# Patient Record
Sex: FEMALE | Race: WHITE | NOT HISPANIC OR LATINO | Employment: OTHER | URBAN - NONMETROPOLITAN AREA
[De-identification: names, ages, dates, MRNs, and addresses within clinical notes are randomized per-mention and may not be internally consistent; named-entity substitution may affect disease eponyms.]

---

## 2021-01-13 ENCOUNTER — OFFICE VISIT (OUTPATIENT)
Dept: CARDIOLOGY | Facility: CLINIC | Age: 86
End: 2021-01-13

## 2021-01-13 VITALS
OXYGEN SATURATION: 95 % | HEIGHT: 62 IN | BODY MASS INDEX: 33.49 KG/M2 | WEIGHT: 182 LBS | TEMPERATURE: 97.5 F | SYSTOLIC BLOOD PRESSURE: 132 MMHG | DIASTOLIC BLOOD PRESSURE: 70 MMHG | HEART RATE: 53 BPM

## 2021-01-13 DIAGNOSIS — E78.5 HYPERLIPIDEMIA LDL GOAL <100: ICD-10-CM

## 2021-01-13 DIAGNOSIS — I50.33 ACUTE ON CHRONIC DIASTOLIC CHF (CONGESTIVE HEART FAILURE) (HCC): Primary | ICD-10-CM

## 2021-01-13 DIAGNOSIS — I48.20 ATRIAL FIBRILLATION, CHRONIC (HCC): ICD-10-CM

## 2021-01-13 DIAGNOSIS — I10 ESSENTIAL HYPERTENSION: ICD-10-CM

## 2021-01-13 PROCEDURE — 99214 OFFICE O/P EST MOD 30 MIN: CPT | Performed by: INTERNAL MEDICINE

## 2021-01-13 PROCEDURE — 93000 ELECTROCARDIOGRAM COMPLETE: CPT | Performed by: INTERNAL MEDICINE

## 2021-01-13 RX ORDER — TORSEMIDE 100 MG/1
100 TABLET ORAL DAILY
Qty: 90 TABLET | Refills: 3 | Status: SHIPPED | OUTPATIENT
Start: 2021-01-13 | End: 2021-04-13

## 2021-01-13 RX ORDER — INSULIN GLARGINE 100 [IU]/ML
100 INJECTION, SOLUTION SUBCUTANEOUS
COMMUNITY
Start: 2020-12-30 | End: 2022-05-24 | Stop reason: SDUPTHER

## 2021-01-13 RX ORDER — PEN NEEDLE, DIABETIC 29 G X1/2"
NEEDLE, DISPOSABLE MISCELLANEOUS
COMMUNITY
Start: 2020-12-12 | End: 2022-05-24 | Stop reason: SDUPTHER

## 2021-01-13 RX ORDER — ATORVASTATIN CALCIUM 40 MG/1
40 TABLET, FILM COATED ORAL DAILY
Qty: 90 TABLET | Refills: 3 | Status: SHIPPED | OUTPATIENT
Start: 2021-01-13

## 2021-01-13 RX ORDER — DILTIAZEM HYDROCHLORIDE 360 MG/1
360 CAPSULE, EXTENDED RELEASE ORAL DAILY
Qty: 90 CAPSULE | Refills: 3 | Status: SHIPPED | OUTPATIENT
Start: 2021-01-13 | End: 2021-04-13

## 2021-01-13 RX ORDER — GUAIFENESIN 600 MG/1
600 TABLET, EXTENDED RELEASE ORAL 2 TIMES DAILY
COMMUNITY

## 2021-01-13 RX ORDER — CALCIUM CITRATE/VITAMIN D3 200MG-6.25
TABLET ORAL
COMMUNITY
Start: 2020-10-22 | End: 2022-05-24 | Stop reason: SDUPTHER

## 2021-01-13 RX ORDER — MONTELUKAST SODIUM 10 MG/1
10 TABLET ORAL NIGHTLY
COMMUNITY
End: 2022-05-24 | Stop reason: ALTCHOICE

## 2021-01-13 NOTE — PROGRESS NOTES
[facility] [place]  [facility] CARDIOLOGY  [address] [place] [address]  Dept: [phone]  Dept Fax: [phone]    Rena Hernandez  11/23/1931    Follow Up Office Visit Note    History of Present Illness:  Rena Hernandez is a 89 y.o. female who presents to the clinic for Follow-up and Congestive Heart Failure. She seems steady 1 plus edema, no major SOB, no chest pain, on torsemide 100.50 alternating,     The following portions of the patient's history were reviewed and updated as appropriate: allergies, current medications, past family history, past medical history, past social history, past surgical history and problem list.    Medications:  apixaban tablet  atorvastatin  BD Insulin Syringe U/F misc  calcium carbonate  dilTIAZem CD  famotidine  guaiFENesin  iron polysacch complex-B12-FA capsule  Lantus solution  montelukast  senna  torsemide  True Metrix Blood Glucose Test strip    Subjective  Allergies   Allergen Reactions   • Ibuprofen Other (See Comments)     Kidney bleed        Past Medical History:   Diagnosis Date   • A-fib (CMS/HCC)    • Body mass index 30.0-30.9, adult    • Carotid artery disease (CMS/HCC)    • Chest pain    • CHF (congestive heart failure) (CMS/HCC)    • Chronic atrial fibrillation, unspecified (CMS/HCC)    • Diabetes mellitus due to underlying condition (CMS/HCC)    • Diastolic heart failure (CMS/HCC)     She is doing well, no edema, no major SOb on 100.50 Torsemide alternating.   • Dyspnea    • Edema    • Hyperlipidemia    • Hypertension    • Mesenteric ischemia (CMS/HCC)    • Renal disease    • Syncope    • Ventricular tachycardia (CMS/HCC)        Past Surgical History:   Procedure Laterality Date   • ABDOMINAL AORTIC ANEURYSM REPAIR         Family History   Family history unknown: Yes        Social History     Socioeconomic History   • Marital status:      Spouse name: Not on file   • Number of children: Not on file   • Years of education: Not on  "file   • Highest education level: Not on file   Tobacco Use   • Smoking status: Never Smoker   • Smokeless tobacco: Never Used   • Tobacco comment: UNKNOWN   Substance and Sexual Activity   • Alcohol use: Never     Frequency: Never   • Drug use: Never   • Sexual activity: Defer       Review of Systems   Constitutional: Negative.    HENT: Negative.    Respiratory: Negative.    Cardiovascular: Positive for leg swelling.   Endocrine: Negative.    Genitourinary: Negative.    Musculoskeletal: Negative.    Skin: Negative.    Allergic/Immunologic: Negative.    Neurological: Negative.    Hematological: Negative.    Psychiatric/Behavioral: Negative.        Cardiovascular Procedures    ECHO/MUGA: ECHOCARDIOGRAM - SCAN - ECHO-09- (01/11/2021)    STRESS TESTS:   CARDIAC CATH:  DEVICES:   HOLTER:   CT/MRI:   VASCULAR:   CARDIOTHORACIC:    Objective  Vitals:    01/13/21 1304   BP: 132/70   BP Location: Left arm   Patient Position: Sitting   Cuff Size: Large Adult   Pulse: 53   Temp: 97.5 °F (36.4 °C)   TempSrc: Infrared   SpO2: 95%   Weight: 82.6 kg (182 lb)   Height: 157.5 cm (62\")   PainSc: 0-No pain     Body mass index is 33.29 kg/m².     Physical Exam  Edema:     Peripheral edema present.     Pretibial: 2+ edema of the left pretibial area.     Ankle: 2+ edema of the left ankle.     Feet: 2+ edema of the left foot.         Diagnostic Data    ECG 12 Lead    Date/Time: 1/13/2021 1:26 PM  Performed by: Sterling Ramon MD  Authorized by: Sterling Ramon MD   Comparison: compared with previous ECG from 7/16/2019  Similar to previous ECG  Rhythm: atrial fibrillation  Rate: normal  QRS axis: left              Assessment and Plan  Diagnoses and all orders for this visit:    Acute on chronic diastolic CHF (congestive heart failure) (CMS/HCC)-   She seems doing well, steady, 1 plus edema, no chest pain, , no SOB,     Atrial fibrillation, chronic (CMS/HCC)- rate control 70 on Cardizem 360 mg and Eliquis 2,5 bid " "    Essential hypertension- BP is 125.80 , will keep just diuretics and Cardizem,    Hyperlipidemia LDL goal <100- on Lipitor  40 mg    Other orders  -     guaiFENesin (MUCINEX) 600 MG 12 hr tablet; Take 600 mg by mouth 2 (Two) Times a Day.  -     montelukast (SINGULAIR) 10 MG tablet; Take 10 mg by mouth Every Night.  -     BD Insulin Syringe U/F 31G X 5/16\" 0.5 ML misc  -     True Metrix Blood Glucose Test test strip  -     Lantus 100 UNIT/ML injection; 100 Units. Take as directed.         No follow-ups on file.    Sterling Ramon MD  01/13/2021  "

## 2021-04-13 ENCOUNTER — OFFICE VISIT (OUTPATIENT)
Dept: CARDIOLOGY | Facility: CLINIC | Age: 86
End: 2021-04-13

## 2021-04-13 VITALS
SYSTOLIC BLOOD PRESSURE: 130 MMHG | HEIGHT: 62 IN | WEIGHT: 173 LBS | RESPIRATION RATE: 12 BRPM | OXYGEN SATURATION: 99 % | DIASTOLIC BLOOD PRESSURE: 67 MMHG | HEART RATE: 58 BPM | BODY MASS INDEX: 31.83 KG/M2 | TEMPERATURE: 97.7 F

## 2021-04-13 DIAGNOSIS — E78.5 HYPERLIPIDEMIA LDL GOAL <100: ICD-10-CM

## 2021-04-13 DIAGNOSIS — I50.33 ACUTE ON CHRONIC DIASTOLIC CHF (CONGESTIVE HEART FAILURE) (HCC): Primary | ICD-10-CM

## 2021-04-13 DIAGNOSIS — I10 ESSENTIAL HYPERTENSION: ICD-10-CM

## 2021-04-13 DIAGNOSIS — I48.20 ATRIAL FIBRILLATION, CHRONIC (HCC): ICD-10-CM

## 2021-04-13 PROCEDURE — 93000 ELECTROCARDIOGRAM COMPLETE: CPT | Performed by: INTERNAL MEDICINE

## 2021-04-13 PROCEDURE — 99214 OFFICE O/P EST MOD 30 MIN: CPT | Performed by: INTERNAL MEDICINE

## 2021-04-13 RX ORDER — CARVEDILOL 6.25 MG/1
12.5 TABLET ORAL 2 TIMES DAILY
COMMUNITY
Start: 2021-04-02 | End: 2021-04-13 | Stop reason: SDUPTHER

## 2021-04-13 RX ORDER — TORSEMIDE 100 MG/1
100 TABLET ORAL DAILY
COMMUNITY
End: 2021-10-26 | Stop reason: SDUPTHER

## 2021-04-13 RX ORDER — CARVEDILOL 12.5 MG/1
12.5 TABLET ORAL 2 TIMES DAILY
Qty: 180 TABLET | Refills: 3 | Status: SHIPPED | OUTPATIENT
Start: 2021-04-13 | End: 2022-05-10 | Stop reason: SDUPTHER

## 2021-04-13 NOTE — PROGRESS NOTES
[facility] [place]  [facility] CARDIOLOGY  [address] [place] [address]  Dept: [phone]  Dept Fax: [phone]    Rena Hernandez  11/23/1931    Follow Up Office Visit Note    History of Present Illness:  Rena Hernandez is a 89 y.o. female who presents to the clinic for follow up for CAF-She has been at the hospital with chest pain, ruled out for Mi, he meds rate control has changed to Coreg 6,25 bid instead Cardizem, her HR is 98, resting, will increase Coreg to 12,5 bid,keep Eliquis 2,5 bid     The following portions of the patient's history were reviewed and updated as appropriate: allergies, current medications, past family history, past medical history, past social history, past surgical history and problem list.    Medications:  apixaban tablet  atorvastatin  BD Insulin Syringe U/F misc  calcium carbonate  carvedilol  famotidine  guaiFENesin  iron polysacch complex-B12-FA capsule  Lantus solution  montelukast  senna  torsemide  True Metrix Blood Glucose Test strip    Subjective  Allergies   Allergen Reactions   • Ibuprofen Other (See Comments)     Kidney bleed        Past Medical History:   Diagnosis Date   • A-fib (CMS/HCC)    • Body mass index 30.0-30.9, adult    • Carotid artery disease (CMS/HCC)    • Chest pain    • CHF (congestive heart failure) (CMS/HCC)    • Chronic atrial fibrillation, unspecified (CMS/HCC)    • Diabetes mellitus due to underlying condition (CMS/HCC)    • Diastolic heart failure (CMS/HCC)     She is doing well, no edema, no major SOb on 100.50 Torsemide alternating.   • Dyspnea    • Edema    • Hyperlipidemia    • Hypertension    • Mesenteric ischemia (CMS/HCC)    • Renal disease    • Syncope    • Ventricular tachycardia (CMS/HCC)        Past Surgical History:   Procedure Laterality Date   • ABDOMINAL AORTIC ANEURYSM REPAIR         Family History   Family history unknown: Yes        Social History     Socioeconomic History   • Marital status:       "Spouse name: Not on file   • Number of children: Not on file   • Years of education: Not on file   • Highest education level: Not on file   Tobacco Use   • Smoking status: Never Smoker   • Smokeless tobacco: Never Used   • Tobacco comment: UNKNOWN   Substance and Sexual Activity   • Alcohol use: Never   • Drug use: Never   • Sexual activity: Defer       Review of Systems   All other systems reviewed and are negative.      Cardiovascular Procedures    ECHO/MUGA:   STRESS TESTS:   CARDIAC CATH:   DEVICES:   HOLTER:   CT/MRI:   VASCULAR:   CARDIOTHORACIC:     Objective  Vitals:    04/13/21 1029   BP: 130/67   BP Location: Left arm   Patient Position: Sitting   Cuff Size: Adult   Pulse: 58   Resp: 12   Temp: 97.7 °F (36.5 °C)   TempSrc: Infrared   SpO2: 99%   Weight: 78.5 kg (173 lb)   Height: 157.5 cm (62\")   PainSc: 0-No pain     Body mass index is 31.64 kg/m².     Physical Exam  Constitutional:       Appearance: Healthy appearance. Not in distress.   Neck:      Vascular: No JVR. JVD normal.   Pulmonary:      Effort: Pulmonary effort is normal.      Breath sounds: Normal breath sounds. No wheezing. No rhonchi. No rales.   Chest:      Chest wall: Not tender to palpatation.   Cardiovascular:      PMI at left midclavicular line. Normal rate. Regular rhythm. Normal S1. Normal S2.      Murmurs: There is no murmur.      No gallop. No click. No rub.   Pulses:     Intact distal pulses.   Edema:     Peripheral edema absent.   Abdominal:      General: Bowel sounds are normal.      Palpations: Abdomen is soft.      Tenderness: There is no abdominal tenderness.   Musculoskeletal: Normal range of motion.         General: No tenderness. Skin:     General: Skin is warm and dry.   Neurological:      General: No focal deficit present.      Mental Status: Alert and oriented to person, place and time.          Diagnostic Data    ECG 12 Lead    Date/Time: 4/13/2021 11:14 AM  Performed by: Sterling Ramon MD  Authorized by: " Sterling Ramon MD   Comparison: compared with previous ECG   Similar to previous ECG  Rhythm: atrial fibrillation  BPM: 98  QRS axis: left    Clinical impression: abnormal EKG            Assessment and Plan  Diagnoses and all orders for this visit:    Acute on chronic diastolic CHF (congestive heart failure) (CMS/HCC)- She has mild edema, has gained some weight has CRF, will increase back the Torsemide to 100.50 alternating, will get lab  -     CBC & Differential  -     Comprehensive Metabolic Panel    Atrial fibrillation, chronic (CMS/HCC)- rate control is 98, will double Coreg to 12.5 bid, keep Eliquis 2,5 bid     Essential hypertension- The BP is 110.60. only on Coreg     Hyperlipidemia LDL goal <100- On Lipitor     Other orders  -     Discontinue: carvedilol (COREG) 6.25 MG tablet; Take 12.5 mg by mouth 2 (Two) Times a Day.  -     torsemide (DEMADEX) 100 MG tablet; Take 100 mg by mouth Daily. Pt is taking 1/2 pill per day  -     carvedilol (COREG) 12.5 MG tablet; Take 1 tablet by mouth 2 (Two) Times a Day.         No follow-ups on file.    Sterling Ramon MD  04/13/2021

## 2021-04-14 LAB
ALBUMIN SERPL-MCNC: 3.4 G/DL (ref 3.6–4.6)
ALBUMIN/GLOB SERPL: 1.4 {RATIO} (ref 1.2–2.2)
ALP SERPL-CCNC: 98 IU/L (ref 39–117)
ALT SERPL-CCNC: 8 IU/L (ref 0–32)
AST SERPL-CCNC: 9 IU/L (ref 0–40)
BASOPHILS # BLD AUTO: 0 X10E3/UL (ref 0–0.2)
BASOPHILS NFR BLD AUTO: 1 %
BILIRUB SERPL-MCNC: 0.3 MG/DL (ref 0–1.2)
BUN SERPL-MCNC: 46 MG/DL (ref 8–27)
BUN/CREAT SERPL: 23 (ref 12–28)
CALCIUM SERPL-MCNC: 8.3 MG/DL (ref 8.7–10.3)
CHLORIDE SERPL-SCNC: 105 MMOL/L (ref 96–106)
CO2 SERPL-SCNC: 23 MMOL/L (ref 20–29)
CREAT SERPL-MCNC: 1.96 MG/DL (ref 0.57–1)
EOSINOPHIL # BLD AUTO: 0.3 X10E3/UL (ref 0–0.4)
EOSINOPHIL NFR BLD AUTO: 3 %
ERYTHROCYTE [DISTWIDTH] IN BLOOD BY AUTOMATED COUNT: 13.6 % (ref 11.7–15.4)
GLOBULIN SER CALC-MCNC: 2.5 G/DL (ref 1.5–4.5)
GLUCOSE SERPL-MCNC: 224 MG/DL (ref 65–99)
HCT VFR BLD AUTO: 30.9 % (ref 34–46.6)
HGB BLD-MCNC: 10.3 G/DL (ref 11.1–15.9)
IMM GRANULOCYTES # BLD AUTO: 0 X10E3/UL (ref 0–0.1)
IMM GRANULOCYTES NFR BLD AUTO: 0 %
LYMPHOCYTES # BLD AUTO: 1.2 X10E3/UL (ref 0.7–3.1)
LYMPHOCYTES NFR BLD AUTO: 16 %
MCH RBC QN AUTO: 31.3 PG (ref 26.6–33)
MCHC RBC AUTO-ENTMCNC: 33.3 G/DL (ref 31.5–35.7)
MCV RBC AUTO: 94 FL (ref 79–97)
MONOCYTES # BLD AUTO: 0.4 X10E3/UL (ref 0.1–0.9)
MONOCYTES NFR BLD AUTO: 6 %
NEUTROPHILS # BLD AUTO: 5.4 X10E3/UL (ref 1.4–7)
NEUTROPHILS NFR BLD AUTO: 74 %
PLATELET # BLD AUTO: 233 X10E3/UL (ref 150–450)
POTASSIUM SERPL-SCNC: 4.5 MMOL/L (ref 3.5–5.2)
PROT SERPL-MCNC: 5.9 G/DL (ref 6–8.5)
RBC # BLD AUTO: 3.29 X10E6/UL (ref 3.77–5.28)
SODIUM SERPL-SCNC: 143 MMOL/L (ref 134–144)
WBC # BLD AUTO: 7.4 X10E3/UL (ref 3.4–10.8)

## 2021-04-15 ENCOUNTER — TELEPHONE (OUTPATIENT)
Dept: CARDIOLOGY | Facility: CLINIC | Age: 86
End: 2021-04-15

## 2021-07-13 ENCOUNTER — OFFICE VISIT (OUTPATIENT)
Dept: CARDIOLOGY | Facility: CLINIC | Age: 86
End: 2021-07-13

## 2021-07-13 VITALS
SYSTOLIC BLOOD PRESSURE: 128 MMHG | DIASTOLIC BLOOD PRESSURE: 72 MMHG | HEART RATE: 78 BPM | RESPIRATION RATE: 12 BRPM | WEIGHT: 175 LBS | BODY MASS INDEX: 32.2 KG/M2 | OXYGEN SATURATION: 99 % | TEMPERATURE: 97 F | HEIGHT: 62 IN

## 2021-07-13 DIAGNOSIS — I10 ESSENTIAL HYPERTENSION: ICD-10-CM

## 2021-07-13 DIAGNOSIS — I50.33 ACUTE ON CHRONIC DIASTOLIC CHF (CONGESTIVE HEART FAILURE) (HCC): Primary | ICD-10-CM

## 2021-07-13 DIAGNOSIS — I48.20 ATRIAL FIBRILLATION, CHRONIC (HCC): ICD-10-CM

## 2021-07-13 DIAGNOSIS — E78.5 HYPERLIPIDEMIA LDL GOAL <100: ICD-10-CM

## 2021-07-13 PROCEDURE — 99214 OFFICE O/P EST MOD 30 MIN: CPT | Performed by: INTERNAL MEDICINE

## 2021-07-13 RX ORDER — DOXYCYCLINE HYCLATE 100 MG
TABLET ORAL
COMMUNITY
Start: 2021-06-28 | End: 2022-04-26 | Stop reason: ALTCHOICE

## 2021-07-13 RX ORDER — FLUTICASONE PROPIONATE 50 MCG
SPRAY, SUSPENSION (ML) NASAL
COMMUNITY
Start: 2021-06-28 | End: 2022-05-24 | Stop reason: SDUPTHER

## 2021-07-13 RX ORDER — IRON POLYSACCHARIDE COMPLEX 150 MG
CAPSULE ORAL
COMMUNITY
Start: 2021-05-17 | End: 2021-07-13 | Stop reason: SDUPTHER

## 2021-07-13 RX ORDER — HYDROXYZINE HYDROCHLORIDE 25 MG/1
TABLET, FILM COATED ORAL
COMMUNITY
Start: 2021-06-28 | End: 2022-04-26 | Stop reason: ALTCHOICE

## 2021-07-13 NOTE — PROGRESS NOTES
[facility] [place]  [facility] CARDIOLOGY  [address] [place] [address]  Dept: [phone]  Dept Fax: [phone]    Rena Hernandez  11/23/1931    Follow Up Office Visit Note    History of Present Illness:  Rena Hernandez is a 89 y.o. female who presents to the clinic for Follow-up. Diastolic heart failure- She seems steady, on torsemide 100.50l no major edema, no SOB, BP is 120.60, will get a BMP    The following portions of the patient's history were reviewed and updated as appropriate: allergies, current medications, past family history, past medical history, past social history, past surgical history and problem list.    Medications:  apixaban tablet  atorvastatin  BD Insulin Syringe U/F misc  calcium carbonate  carvedilol  doxycycline  famotidine  fluticasone  guaiFENesin  hydrOXYzine  Lantus solution  montelukast  senna  torsemide  True Metrix Blood Glucose Test strip    Subjective  Allergies   Allergen Reactions   • Ibuprofen Other (See Comments)     Kidney bleed        Past Medical History:   Diagnosis Date   • A-fib (CMS/HCC)    • Body mass index 30.0-30.9, adult    • Carotid artery disease (CMS/HCC)    • Chest pain    • CHF (congestive heart failure) (CMS/HCC)    • Chronic atrial fibrillation, unspecified (CMS/HCC)    • Diabetes mellitus due to underlying condition (CMS/HCC)    • Diastolic heart failure (CMS/HCC)     She is doing well, no edema, no major SOb on 100.50 Torsemide alternating.   • Dyspnea    • Edema    • Hyperlipidemia    • Hypertension    • Mesenteric ischemia (CMS/HCC)    • Renal disease    • Syncope    • Ventricular tachycardia (CMS/HCC)        Past Surgical History:   Procedure Laterality Date   • ABDOMINAL AORTIC ANEURYSM REPAIR         Family History   Family history unknown: Yes        Social History     Socioeconomic History   • Marital status:      Spouse name: Not on file   • Number of children: Not on file   • Years of education: Not on file  "  • Highest education level: Not on file   Tobacco Use   • Smoking status: Never Smoker   • Smokeless tobacco: Never Used   • Tobacco comment: UNKNOWN   Vaping Use   • Vaping Use: Never used   Substance and Sexual Activity   • Alcohol use: Never   • Drug use: Never   • Sexual activity: Defer       Review of Systems   Constitutional: Negative.    HENT: Negative.    Respiratory: Negative.    Cardiovascular: Negative.    Endocrine: Negative.    Genitourinary: Negative.    Musculoskeletal: Negative.    Skin: Negative.    Allergic/Immunologic: Negative.    Neurological: Negative.    Hematological: Negative.    Psychiatric/Behavioral: Negative.    All other systems reviewed and are negative.      Cardiovascular Procedures    ECHO/MUGA:   STRESS TESTS:   CARDIAC CATH:   DEVICES:   HOLTER:   CT/MRI:   VASCULAR:   CARDIOTHORACIC:     Objective  Vitals:    07/13/21 1413   BP: 128/72   BP Location: Left arm   Patient Position: Sitting   Cuff Size: Adult   Pulse: 78   Resp: 12   Temp: 97 °F (36.1 °C)   TempSrc: Infrared   SpO2: 99%   Weight: 79.4 kg (175 lb)   Height: 157.5 cm (62\")   PainSc: 0-No pain     Body mass index is 32.01 kg/m².     Physical Exam  Constitutional:       Appearance: Healthy appearance. Not in distress.   Neck:      Vascular: No JVR. JVD normal.   Pulmonary:      Effort: Pulmonary effort is normal.      Breath sounds: Normal breath sounds. No wheezing. No rhonchi. No rales.   Chest:      Chest wall: Not tender to palpatation.   Cardiovascular:      PMI at left midclavicular line. Normal rate. Regular rhythm. Normal S1. Normal S2.      Murmurs: There is no murmur.      No gallop. No click. No rub.   Pulses:     Intact distal pulses.   Edema:     Peripheral edema absent.   Abdominal:      General: Bowel sounds are normal.      Palpations: Abdomen is soft.      Tenderness: There is no abdominal tenderness.   Musculoskeletal: Normal range of motion.         General: No tenderness. Skin:     General: Skin is " warm and dry.   Neurological:      General: No focal deficit present.      Mental Status: Alert and oriented to person, place and time.          Diagnostic Data  Procedures    Assessment and Plan  Diagnoses and all orders for this visit:    Acute on chronic diastolic CHF (congestive heart failure) (CMS/HCC)- On Torsemide 100.50 every other day , seems doing well, will get lab  -     Basic Metabolic Panel    Atrial fibrillation, chronic (CMS/[facility])- rate control on Coreg 12,5 bid, and Eliquis 2,5 bid    Essential hypertension- BP is fine 120.60 just on diuretics and Coreg     Hyperlipidemia LDL goal <100- On Lipitor     Other orders  -     Discontinue: Poly-Iron 150 150 MG capsule  -     hydrOXYzine (ATARAX) 25 MG tablet  -     fluticasone (FLONASE) 50 MCG/ACT nasal spray  -     doxycycline (VIBRAMYICN) 100 MG tablet         Return in about 4 months (around 11/13/2021).    Sterling Ramon MD  07/13/2021

## 2021-07-14 LAB
BUN SERPL-MCNC: 57 MG/DL (ref 8–27)
BUN/CREAT SERPL: 25 (ref 12–28)
CALCIUM SERPL-MCNC: 8.8 MG/DL (ref 8.7–10.3)
CHLORIDE SERPL-SCNC: 103 MMOL/L (ref 96–106)
CO2 SERPL-SCNC: 24 MMOL/L (ref 20–29)
CREAT SERPL-MCNC: 2.26 MG/DL (ref 0.57–1)
GLUCOSE SERPL-MCNC: 184 MG/DL (ref 65–99)
POTASSIUM SERPL-SCNC: 4.9 MMOL/L (ref 3.5–5.2)
SODIUM SERPL-SCNC: 141 MMOL/L (ref 134–144)

## 2021-07-15 ENCOUNTER — TELEPHONE (OUTPATIENT)
Dept: CARDIOLOGY | Facility: CLINIC | Age: 86
End: 2021-07-15

## 2021-07-15 NOTE — TELEPHONE ENCOUNTER
Creatinine is steady 2,2 , will need a repeat lab in 1-2 months     Spoke with dmitriy she will bring pt back for blood work . Pt said she feels ok today .

## 2021-10-26 ENCOUNTER — TELEPHONE (OUTPATIENT)
Dept: CARDIOLOGY | Facility: CLINIC | Age: 86
End: 2021-10-26

## 2021-10-26 DIAGNOSIS — I50.33 ACUTE ON CHRONIC DIASTOLIC CHF (CONGESTIVE HEART FAILURE) (HCC): ICD-10-CM

## 2021-10-26 RX ORDER — TORSEMIDE 100 MG/1
100 TABLET ORAL DAILY
Qty: 30 TABLET | Refills: 5 | Status: SHIPPED | OUTPATIENT
Start: 2021-10-26 | End: 2022-04-27 | Stop reason: DRUGHIGH

## 2021-10-26 NOTE — TELEPHONE ENCOUNTER
TAKES 100MG THREE TIMES PER WEEK 50MG REST OF WEEK TORSEMIDE. PLEASE SEND TO [facility] WILL BE OUT Friday

## 2021-11-12 ENCOUNTER — OFFICE VISIT (OUTPATIENT)
Dept: CARDIOLOGY | Facility: CLINIC | Age: 86
End: 2021-11-12

## 2021-11-12 VITALS
RESPIRATION RATE: 11 BRPM | WEIGHT: 179 LBS | OXYGEN SATURATION: 94 % | HEART RATE: 58 BPM | DIASTOLIC BLOOD PRESSURE: 76 MMHG | HEIGHT: 62 IN | SYSTOLIC BLOOD PRESSURE: 148 MMHG | BODY MASS INDEX: 32.94 KG/M2 | TEMPERATURE: 98 F

## 2021-11-12 DIAGNOSIS — I50.33 ACUTE ON CHRONIC DIASTOLIC CHF (CONGESTIVE HEART FAILURE) (HCC): Primary | ICD-10-CM

## 2021-11-12 DIAGNOSIS — I10 ESSENTIAL HYPERTENSION: ICD-10-CM

## 2021-11-12 DIAGNOSIS — E78.5 HYPERLIPIDEMIA LDL GOAL <100: ICD-10-CM

## 2021-11-12 DIAGNOSIS — I48.20 ATRIAL FIBRILLATION, CHRONIC (HCC): ICD-10-CM

## 2021-11-12 PROCEDURE — 99214 OFFICE O/P EST MOD 30 MIN: CPT | Performed by: INTERNAL MEDICINE

## 2021-11-12 NOTE — PROGRESS NOTES
[facility] [place]  [facility] CARDIOLOGY  [address] [place] [address]  Dept: [phone]  Dept Fax: [phone]    Rena Hernandez  11/23/1931    Follow Up Office Visit Note    History of Present Illness:  Rena Hernandez is a 89 y.o. female who presents to the clinic for CAF- She denies any complaints, chest pain, palpitations, on Coreg 12,5 bid and also Eliquis 2,5 bid HR is 60     The following portions of the patient's history were reviewed and updated as appropriate: allergies, current medications, past family history, past medical history, past social history, past surgical history and problem list.    Medications:  apixaban tablet  atorvastatin  BD Insulin Syringe U/F misc  calcium carbonate  carvedilol  doxycycline  famotidine  fluticasone  guaiFENesin  hydrOXYzine  Lantus solution  montelukast  senna  torsemide  True Metrix Blood Glucose Test strip    Subjective  Allergies   Allergen Reactions   • Ibuprofen Other (See Comments)     Kidney bleed        Past Medical History:   Diagnosis Date   • A-fib ([facility])    • Body mass index 30.0-30.9, adult    • Carotid artery disease ([facility])    • Chest pain    • CHF (congestive heart failure) ([facility])    • Chronic atrial fibrillation, unspecified ([facility])    • Diabetes mellitus due to underlying condition ([facility])    • Diastolic heart failure ([facility])     She is doing well, no edema, no major SOb on 100.50 Torsemide alternating.   • Dyspnea    • Edema    • Hyperlipidemia    • Hypertension    • Mesenteric ischemia ([facility])    • Renal disease    • Syncope    • Ventricular tachycardia ([facility])        Past Surgical History:   Procedure Laterality Date   • ABDOMINAL AORTIC ANEURYSM REPAIR         Family History   Family history unknown: Yes        Social History     Socioeconomic History   • Marital status:    Tobacco Use   • Smoking status: Never Smoker   • Smokeless tobacco: Never Used   • Tobacco comment: UNKNOWN   Vaping Use   • Vaping Use: Never used  "  Substance and Sexual Activity   • Alcohol use: Never   • Drug use: Never   • Sexual activity: Defer       Review of Systems   Constitutional: Negative.    HENT: Negative.    Respiratory: Negative.    Cardiovascular: Negative.    Endocrine: Negative.    Genitourinary: Negative.    Musculoskeletal: Negative.    Skin: Negative.    Allergic/Immunologic: Negative.    Neurological: Negative.    Hematological: Negative.    Psychiatric/Behavioral: Negative.    All other systems reviewed and are negative.      Cardiovascular Procedures    ECHO/MUGA:   STRESS TESTS:   CARDIAC CATH:   DEVICES:   HOLTER:   CT/MRI:   VASCULAR:   CARDIOTHORACIC:     Objective  Vitals:    11/12/21 1329   BP: 148/76   BP Location: Left arm   Patient Position: Sitting   Cuff Size: Adult   Pulse: 58   Resp: 11   Temp: 98 °F (36.7 °C)   TempSrc: Infrared   SpO2: 94%   Weight: 81.2 kg (179 lb)   Height: 157.5 cm (62\")   PainSc: 0-No pain     Body mass index is 32.74 kg/m².     Physical Exam  Constitutional:       Appearance: Healthy appearance. Not in distress.   Neck:      Vascular: No JVR. JVD normal.   Pulmonary:      Effort: Pulmonary effort is normal.      Breath sounds: Normal breath sounds. No wheezing. No rhonchi. No rales.   Chest:      Chest wall: Not tender to palpatation.   Cardiovascular:      PMI at left midclavicular line. Normal rate. Irregularly irregular rhythm. Normal S1. Normal S2.      Murmurs: There is no murmur.      No gallop. No click. No rub.   Pulses:     Intact distal pulses.   Edema:     Peripheral edema absent.   Abdominal:      General: Bowel sounds are normal.      Palpations: Abdomen is soft.      Tenderness: There is no abdominal tenderness.   Musculoskeletal: Normal range of motion.         General: No tenderness. Skin:     General: Skin is warm and dry.   Neurological:      General: No focal deficit present.      Mental Status: Alert and oriented to person, place and time.          Diagnostic " Data  Procedures    Assessment and Plan  Diagnoses and all orders for this visit:    Acute on chronic diastolic CHF (congestive heart failure) (HCC)- No edema on Torsemide 100.50 daily, alternating, seems steady, has CRF will get a BMP , her nephrologist is Dr Cazares   -     CBC & Differential  -     Comprehensive Metabolic Panel    Atrial fibrillation, chronic (HCC)- rate control on Coreg 12,5 bid and also Eliquis 2,5 bid    Essential hypertension- BP is 110.60 only on Coreg 12,5 bid plus diuretics Torsemide 100.50     Hyperlipidemia LDL goal <100- On  Lipitor 40 mg, will keep same meds, has tolerate well          Return in about 6 months (around 5/12/2022) for Recheck.    Sterling Ramon MD  11/12/2021

## 2021-11-13 LAB
ALBUMIN SERPL-MCNC: 3.7 G/DL (ref 3.6–4.6)
ALBUMIN/GLOB SERPL: 1.4 {RATIO} (ref 1.2–2.2)
ALP SERPL-CCNC: 106 IU/L (ref 44–121)
ALT SERPL-CCNC: 9 IU/L (ref 0–32)
AST SERPL-CCNC: 11 IU/L (ref 0–40)
BASOPHILS # BLD AUTO: 0 X10E3/UL (ref 0–0.2)
BASOPHILS NFR BLD AUTO: 1 %
BILIRUB SERPL-MCNC: 0.2 MG/DL (ref 0–1.2)
BUN SERPL-MCNC: 53 MG/DL (ref 8–27)
BUN/CREAT SERPL: 24 (ref 12–28)
CALCIUM SERPL-MCNC: 9.2 MG/DL (ref 8.7–10.3)
CHLORIDE SERPL-SCNC: 102 MMOL/L (ref 96–106)
CO2 SERPL-SCNC: 25 MMOL/L (ref 20–29)
CREAT SERPL-MCNC: 2.18 MG/DL (ref 0.57–1)
EOSINOPHIL # BLD AUTO: 0.3 X10E3/UL (ref 0–0.4)
EOSINOPHIL NFR BLD AUTO: 5 %
ERYTHROCYTE [DISTWIDTH] IN BLOOD BY AUTOMATED COUNT: 13.5 % (ref 11.7–15.4)
GLOBULIN SER CALC-MCNC: 2.7 G/DL (ref 1.5–4.5)
GLUCOSE SERPL-MCNC: 263 MG/DL (ref 65–99)
HCT VFR BLD AUTO: 30.9 % (ref 34–46.6)
HGB BLD-MCNC: 10 G/DL (ref 11.1–15.9)
IMM GRANULOCYTES # BLD AUTO: 0 X10E3/UL (ref 0–0.1)
IMM GRANULOCYTES NFR BLD AUTO: 0 %
LYMPHOCYTES # BLD AUTO: 1.5 X10E3/UL (ref 0.7–3.1)
LYMPHOCYTES NFR BLD AUTO: 25 %
MCH RBC QN AUTO: 30.8 PG (ref 26.6–33)
MCHC RBC AUTO-ENTMCNC: 32.4 G/DL (ref 31.5–35.7)
MCV RBC AUTO: 95 FL (ref 79–97)
MONOCYTES # BLD AUTO: 0.6 X10E3/UL (ref 0.1–0.9)
MONOCYTES NFR BLD AUTO: 10 %
NEUTROPHILS # BLD AUTO: 3.3 X10E3/UL (ref 1.4–7)
NEUTROPHILS NFR BLD AUTO: 59 %
PLATELET # BLD AUTO: 230 X10E3/UL (ref 150–450)
POTASSIUM SERPL-SCNC: 4.9 MMOL/L (ref 3.5–5.2)
PROT SERPL-MCNC: 6.4 G/DL (ref 6–8.5)
RBC # BLD AUTO: 3.25 X10E6/UL (ref 3.77–5.28)
SODIUM SERPL-SCNC: 141 MMOL/L (ref 134–144)
WBC # BLD AUTO: 5.7 X10E3/UL (ref 3.4–10.8)

## 2021-11-15 ENCOUNTER — TELEPHONE (OUTPATIENT)
Dept: CARDIOLOGY | Facility: CLINIC | Age: 86
End: 2021-11-15

## 2021-11-15 NOTE — TELEPHONE ENCOUNTER
----- Message from Sterling Ramon MD sent at 11/13/2021  6:16 AM EST -----  Creatinine is steady 2,8 and she is anemic 10 hemoglobin

## 2021-11-15 NOTE — TELEPHONE ENCOUNTER
Tried calling pt left a vm to call back, please give message below...    Creatinine is steady 2,8 and she is anemic 10 hemoglobin

## 2022-04-26 ENCOUNTER — TELEPHONE (OUTPATIENT)
Dept: CARDIOLOGY | Facility: CLINIC | Age: 87
End: 2022-04-26

## 2022-04-26 ENCOUNTER — OFFICE VISIT (OUTPATIENT)
Dept: CARDIOLOGY | Facility: CLINIC | Age: 87
End: 2022-04-26

## 2022-04-26 VITALS
WEIGHT: 191 LBS | TEMPERATURE: 97.1 F | OXYGEN SATURATION: 94 % | BODY MASS INDEX: 35.15 KG/M2 | RESPIRATION RATE: 15 BRPM | DIASTOLIC BLOOD PRESSURE: 70 MMHG | HEIGHT: 62 IN | HEART RATE: 75 BPM | SYSTOLIC BLOOD PRESSURE: 152 MMHG

## 2022-04-26 DIAGNOSIS — E78.5 HYPERLIPIDEMIA LDL GOAL <100: ICD-10-CM

## 2022-04-26 DIAGNOSIS — I50.33 ACUTE ON CHRONIC DIASTOLIC CHF (CONGESTIVE HEART FAILURE): Primary | ICD-10-CM

## 2022-04-26 DIAGNOSIS — I48.20 ATRIAL FIBRILLATION, CHRONIC: ICD-10-CM

## 2022-04-26 DIAGNOSIS — I10 ESSENTIAL HYPERTENSION: ICD-10-CM

## 2022-04-26 PROCEDURE — 99214 OFFICE O/P EST MOD 30 MIN: CPT | Performed by: INTERNAL MEDICINE

## 2022-04-26 RX ORDER — DOCUSATE SODIUM 100 MG/1
100 CAPSULE, LIQUID FILLED ORAL 2 TIMES DAILY
COMMUNITY
End: 2022-05-24 | Stop reason: SDUPTHER

## 2022-04-26 NOTE — TELEPHONE ENCOUNTER
Pt is gradually gaining weight,. They want to know if they can increase pt's fluid pill. Please advise.

## 2022-04-27 ENCOUNTER — TELEPHONE (OUTPATIENT)
Dept: CARDIOLOGY | Facility: CLINIC | Age: 87
End: 2022-04-27

## 2022-04-27 DIAGNOSIS — I50.33 ACUTE ON CHRONIC DIASTOLIC CHF (CONGESTIVE HEART FAILURE): ICD-10-CM

## 2022-04-27 LAB
ALBUMIN SERPL-MCNC: 3.8 G/DL (ref 3.5–4.6)
ALBUMIN/GLOB SERPL: 1.5 {RATIO} (ref 1.2–2.2)
ALP SERPL-CCNC: 108 IU/L (ref 44–121)
ALT SERPL-CCNC: 6 IU/L (ref 0–32)
AST SERPL-CCNC: 12 IU/L (ref 0–40)
BASOPHILS # BLD AUTO: 0 X10E3/UL (ref 0–0.2)
BASOPHILS NFR BLD AUTO: 1 %
BILIRUB SERPL-MCNC: 0.3 MG/DL (ref 0–1.2)
BUN SERPL-MCNC: 58 MG/DL (ref 10–36)
BUN/CREAT SERPL: 21 (ref 12–28)
CALCIUM SERPL-MCNC: 8.7 MG/DL (ref 8.7–10.3)
CHLORIDE SERPL-SCNC: 100 MMOL/L (ref 96–106)
CO2 SERPL-SCNC: 21 MMOL/L (ref 20–29)
CREAT SERPL-MCNC: 2.78 MG/DL (ref 0.57–1)
EGFRCR SERPLBLD CKD-EPI 2021: 16 ML/MIN/1.73
EOSINOPHIL # BLD AUTO: 0.3 X10E3/UL (ref 0–0.4)
EOSINOPHIL NFR BLD AUTO: 5 %
ERYTHROCYTE [DISTWIDTH] IN BLOOD BY AUTOMATED COUNT: 13.4 % (ref 11.7–15.4)
GLOBULIN SER CALC-MCNC: 2.5 G/DL (ref 1.5–4.5)
GLUCOSE SERPL-MCNC: 207 MG/DL (ref 65–99)
HCT VFR BLD AUTO: 31.2 % (ref 34–46.6)
HGB BLD-MCNC: 9.8 G/DL (ref 11.1–15.9)
IMM GRANULOCYTES # BLD AUTO: 0 X10E3/UL (ref 0–0.1)
IMM GRANULOCYTES NFR BLD AUTO: 0 %
LYMPHOCYTES # BLD AUTO: 1.4 X10E3/UL (ref 0.7–3.1)
LYMPHOCYTES NFR BLD AUTO: 24 %
MCH RBC QN AUTO: 29.7 PG (ref 26.6–33)
MCHC RBC AUTO-ENTMCNC: 31.4 G/DL (ref 31.5–35.7)
MCV RBC AUTO: 95 FL (ref 79–97)
MONOCYTES # BLD AUTO: 0.5 X10E3/UL (ref 0.1–0.9)
MONOCYTES NFR BLD AUTO: 8 %
NEUTROPHILS # BLD AUTO: 3.6 X10E3/UL (ref 1.4–7)
NEUTROPHILS NFR BLD AUTO: 62 %
PLATELET # BLD AUTO: 234 X10E3/UL (ref 150–450)
POTASSIUM SERPL-SCNC: 4.9 MMOL/L (ref 3.5–5.2)
PROT SERPL-MCNC: 6.3 G/DL (ref 6–8.5)
RBC # BLD AUTO: 3.3 X10E6/UL (ref 3.77–5.28)
SODIUM SERPL-SCNC: 139 MMOL/L (ref 134–144)
WBC # BLD AUTO: 5.8 X10E3/UL (ref 3.4–10.8)

## 2022-04-27 PROCEDURE — 93000 ELECTROCARDIOGRAM COMPLETE: CPT | Performed by: INTERNAL MEDICINE

## 2022-04-27 RX ORDER — TORSEMIDE 100 MG/1
50 TABLET ORAL TAKE AS DIRECTED
COMMUNITY
End: 2022-06-23

## 2022-04-27 NOTE — TELEPHONE ENCOUNTER
Spoke with Grace, patients daughter, and advised her that Ms. Crowder creatinine was elevated to 2.7 and Dr. Ramon would like to cut back her Torsemide to 50mg. Daughter verbalized understanding.

## 2022-04-27 NOTE — PROGRESS NOTES
[facility] [place]  [facility] CARDIOLOGY  [address] [place] [address]  Dept: [phone]  Dept Fax: [phone]    Rena Hernandez  11/23/1931    Follow Up Office Visit Note    History of Present Illness:  Rena Hernandez is a 89 y.o. female who presents to the clinic for Diastolic heart failure- She does not have any edema, has some SOB but associated with cough and meals which suggest aspiration, BP is 100.60, she takes 100 Torsemide alternating with 50 mg,  Will get lab and will keep same meds, will advised to see PCP although to help her will get a swallowing study    The following portions of the patient's history were reviewed and updated as appropriate: allergies, current medications, past family history, past social history, past surgical history and problem list.    Medications:  apixaban tablet  atorvastatin  BD Insulin Syringe U/F misc  calcium carbonate  carvedilol  docusate sodium  famotidine  Ferrous Fumarate tablet  fluticasone  guaiFENesin  Lantus solution  montelukast  torsemide  True Metrix Blood Glucose Test strip    Subjective  Allergies   Allergen Reactions   • Ibuprofen Other (See Comments)     Kidney bleed        Past Medical History:   Diagnosis Date   • A-fib (HCC)    • Body mass index 30.0-30.9, adult    • Carotid artery disease (HCC)    • Chest pain    • CHF (congestive heart failure) (HCC)    • Chronic atrial fibrillation, unspecified (HCC)    • Diabetes mellitus due to underlying condition (HCC)    • Diastolic heart failure (HCC)     She is doing well, no edema, no major SOb on 100.50 Torsemide alternating.   • Dyspnea    • Edema    • Hyperlipidemia    • Hypertension    • Mesenteric ischemia (HCC)    • Renal disease    • Syncope    • Ventricular tachycardia ([facility])        Past Surgical History:   Procedure Laterality Date   • ABDOMINAL AORTIC ANEURYSM REPAIR         Family History   Family history unknown: Yes        Social History     Socioeconomic History  "  • Marital status:    Tobacco Use   • Smoking status: Never Smoker   • Smokeless tobacco: Never Used   • Tobacco comment: UNKNOWN   Vaping Use   • Vaping Use: Never used   Substance and Sexual Activity   • Alcohol use: Never   • Drug use: Never   • Sexual activity: Defer       Review of Systems   Constitutional: Negative.    HENT: Negative.    Respiratory: Positive for cough, choking, shortness of breath and wheezing.    Cardiovascular: Negative.    Endocrine: Negative.    Genitourinary: Negative.    Musculoskeletal: Negative.    Skin: Negative.    Allergic/Immunologic: Negative.    Neurological: Negative.    Hematological: Negative.    Psychiatric/Behavioral: Negative.        Cardiovascular Procedures    ECHO/MUGA:  STRESS TESTS:   CARDIAC CATH:  DEVICES:   HOLTER:   CT/MRI:   VASCULAR:   CARDIOTHORACIC:     Objective  Vitals:    04/26/22 1425   BP: 152/70   BP Location: Left arm   Patient Position: Sitting   Cuff Size: Large Adult   Pulse: 75   Resp: 15   Temp: 97.1 °F (36.2 °C)   TempSrc: Infrared   SpO2: 94%   Weight: 86.6 kg (191 lb)   Height: 157.5 cm (62\")   PainSc: 0-No pain     Body mass index is 34.93 kg/m².     Physical Exam  Constitutional:       Appearance: Healthy appearance. Not in distress.   Neck:      Vascular: No JVR. JVD normal.   Pulmonary:      Effort: Pulmonary effort is normal.      Breath sounds: Normal breath sounds. No wheezing. No rhonchi. No rales.   Chest:      Chest wall: Not tender to palpatation.   Cardiovascular:      PMI at left midclavicular line. Normal rate. Regular rhythm. Normal S1. Normal S2.      Murmurs: There is no murmur.      No gallop. No click. No rub.   Pulses:     Intact distal pulses.   Edema:     Peripheral edema absent.   Abdominal:      General: Bowel sounds are normal.      Palpations: Abdomen is soft.      Tenderness: There is no abdominal tenderness.   Musculoskeletal: Normal range of motion.         General: No tenderness. Skin:     General: Skin is " warm and dry.   Neurological:      General: No focal deficit present.      Mental Status: Alert and oriented to person, place and time.          Diagnostic Data    ECG 12 Lead    Date/Time: 4/27/2022 5:35 AM  Performed by: Stelring Ramon MD  Authorized by: Sterling Ramon MD   Comparison: compared with previous ECG from 4/13/2021  Comparison to previous ECG: Prior EKG was AF  Rhythm: sinus rhythm  Rate: normal  BPM: 72  QRS axis: left    Clinical impression: normal ECG            Assessment and Plan  Diagnoses and all orders for this visit:    Acute on chronic diastolic CHF (congestive heart failure) (HCC)- Better, mild edema, seems steady on 100.50 torsemide,m will get lab  -     Cancel: FL Video Swallow With Speech Single Contrast; Future  -     FL Video Swallow With Speech Single Contrast; Future  -     Comprehensive Metabolic Panel  -     CBC & Differential    Atrial fibrillation,  (HCC)- today she is back to sinus, asymptomatic,  Will keep Coreg 12,5 bid and also Eliquis 2,5 bid  -     Cancel: FL Video Swallow With Speech Single Contrast; Future  -     FL Video Swallow With Speech Single Contrast; Future  -     Comprehensive Metabolic Panel  -     CBC & Differential    Essential hypertension- BP is 100.60 on diuretics Torsemide 100.50, and Coreg 12,5 bid    Hyperlipidemia LDL goal <100-On Lipitor    Other orders  -     Ferrous Fumarate 150 MG tablet; Take  by mouth.  -     docusate sodium (COLACE) 100 MG capsule; Take 100 mg by mouth 2 (Two) Times a Day.         Return in about 6 months (around 10/26/2022) for Recheck.    Sterling Ramon MD  04/26/2022

## 2022-04-27 NOTE — TELEPHONE ENCOUNTER
----- Message from Sterling Ramon MD sent at 4/27/2022  6:26 AM EDT -----  Her creatinine is up 2,7 from 2.1 5 months ago, we can lower gthe Torsemide to 50 mg daily

## 2022-05-06 ENCOUNTER — TELEPHONE (OUTPATIENT)
Dept: CARDIOLOGY | Facility: CLINIC | Age: 87
End: 2022-05-06

## 2022-05-06 NOTE — TELEPHONE ENCOUNTER
We lower the Torsemide because of the kidney issues her creatinine was 2,.6, does she have any SOB, any swelling.? Before doing something I will like to see her., if her kidney are doing bad, might go later into dialysis, we might need lab again and reasses.,the most important is  does she has any SOB since we lower the diuretics, ?

## 2022-05-06 NOTE — TELEPHONE ENCOUNTER
Pt's daughter called regarding pt's Torsemide. Tristen decreased the dosage at last appt and Ms Horton believes it has caused the pt's weight to increase. Ms Horton asked if they could increase it back in order to flush any excess water/fluid.    Tristen is out of town so I informed Ms Horton that we may not have an answer for her until the beginning of next week. Either way, she would like to be updated.

## 2022-05-09 NOTE — TELEPHONE ENCOUNTER
Spoke with daughter, Grace, and she advised that Ms. Hernandez is mildly SOA since stopping the diuretic, however did loose 1# over the weekend.  I advised Dr. Ramon would like to see the patient before readding the diuretic since her kidney function was 2.6.  Daughter agreed and we have her coming in tomorrow at 10am.

## 2022-05-10 ENCOUNTER — OFFICE VISIT (OUTPATIENT)
Dept: CARDIOLOGY | Facility: CLINIC | Age: 87
End: 2022-05-10

## 2022-05-10 VITALS
RESPIRATION RATE: 12 BRPM | BODY MASS INDEX: 34.6 KG/M2 | OXYGEN SATURATION: 99 % | TEMPERATURE: 96 F | WEIGHT: 188 LBS | DIASTOLIC BLOOD PRESSURE: 82 MMHG | HEART RATE: 71 BPM | HEIGHT: 62 IN | SYSTOLIC BLOOD PRESSURE: 142 MMHG

## 2022-05-10 DIAGNOSIS — I10 ESSENTIAL HYPERTENSION: ICD-10-CM

## 2022-05-10 DIAGNOSIS — E78.5 HYPERLIPIDEMIA LDL GOAL <100: ICD-10-CM

## 2022-05-10 DIAGNOSIS — I50.33 ACUTE ON CHRONIC DIASTOLIC CHF (CONGESTIVE HEART FAILURE): Primary | ICD-10-CM

## 2022-05-10 DIAGNOSIS — I48.20 ATRIAL FIBRILLATION, CHRONIC: ICD-10-CM

## 2022-05-10 PROCEDURE — 99214 OFFICE O/P EST MOD 30 MIN: CPT | Performed by: INTERNAL MEDICINE

## 2022-05-10 PROCEDURE — 93000 ELECTROCARDIOGRAM COMPLETE: CPT | Performed by: INTERNAL MEDICINE

## 2022-05-10 RX ORDER — CARVEDILOL 25 MG/1
25 TABLET ORAL 2 TIMES DAILY
Qty: 180 TABLET | Refills: 3 | Status: SHIPPED | OUTPATIENT
Start: 2022-05-10 | End: 2022-07-29 | Stop reason: SDUPTHER

## 2022-05-10 NOTE — PROGRESS NOTES
[facility] [place]  [facility] CARDIOLOGY  [address] [place] [address]  Dept: [phone]  Dept Fax: [phone]    Rena Hernandez  11/23/1931    Follow Up Office Visit Note    History of Present Illness:  Rena Hernandez is a 89 y.o. female who presents to the clinic for Follow-up.  Diastolic heart failure- We have lower the torsemide due to increase creatinine to 2,6, however she has now some SOB  Mild edema and gained 3 pounds, will get a BMP, possible will do 100 mg twice a week and 50 mg the others days, also she is tachy,    The following portions of the patient's history were reviewed and updated as appropriate: allergies, current medications, past family history, past medical history, past social history, past surgical history and problem list.    Medications:  apixaban tablet  atorvastatin  BD Insulin Syringe U/F misc  calcium carbonate  carvedilol  docusate sodium  famotidine  Ferrous Fumarate tablet  fluticasone  guaiFENesin  Lantus solution  montelukast  torsemide  True Metrix Blood Glucose Test strip    Subjective  Allergies   Allergen Reactions   • Ibuprofen Other (See Comments)     Kidney bleed        Past Medical History:   Diagnosis Date   • A-fib ([facility])    • Body mass index 30.0-30.9, adult    • Carotid artery disease ([facility])    • Chest pain    • CHF (congestive heart failure) ([facility])    • Chronic atrial fibrillation, unspecified ([facility])    • Diabetes mellitus due to underlying condition ([facility])    • Diastolic heart failure ([facility])     She is doing well, no edema, no major SOb on 100.50 Torsemide alternating.   • Dyspnea    • Edema    • Hyperlipidemia    • Hypertension    • Mesenteric ischemia ([facility])    • Renal disease    • Syncope    • Ventricular tachycardia ([facility])        Past Surgical History:   Procedure Laterality Date   • ABDOMINAL AORTIC ANEURYSM REPAIR         Family History   Family history unknown: Yes        Social History     Socioeconomic History   • Marital status:  "   Tobacco Use   • Smoking status: Never Smoker   • Smokeless tobacco: Never Used   • Tobacco comment: UNKNOWN   Vaping Use   • Vaping Use: Never used   Substance and Sexual Activity   • Alcohol use: Never   • Drug use: Never   • Sexual activity: Defer       Review of Systems   Constitutional: Negative.    HENT: Negative.    Respiratory: Positive for shortness of breath.    Cardiovascular: Positive for leg swelling.   Endocrine: Negative.    Genitourinary: Negative.    Musculoskeletal: Negative.    Skin: Negative.    Allergic/Immunologic: Negative.    Neurological: Negative.    Hematological: Negative.    Psychiatric/Behavioral: Negative.        Cardiovascular Procedures    ECHO/MUGA:   STRESS TESTS:   CARDIAC CATH:   DEVICES:   HOLTER:   CT/MRI:   VASCULAR:   CARDIOTHORACIC:     Objective  Vitals:    05/10/22 0959   BP: 142/82   BP Location: Left arm   Patient Position: Sitting   Cuff Size: Adult   Pulse: 71   Resp: 12   Temp: 96 °F (35.6 °C)   TempSrc: Infrared   SpO2: 99%   Weight: 85.3 kg (188 lb)   Height: 157.5 cm (62\")   PainSc: 0-No pain     Body mass index is 34.39 kg/m².     Physical Exam  Constitutional:       Appearance: Healthy appearance. Not in distress.   Neck:      Vascular: No JVR. JVD normal.   Pulmonary:      Effort: Pulmonary effort is normal.      Breath sounds: Normal breath sounds. No wheezing. No rhonchi. No rales.   Chest:      Chest wall: Not tender to palpatation.   Cardiovascular:      PMI at left midclavicular line. Normal rate. Regular rhythm. Normal S1. Normal S2.      Murmurs: There is no murmur.      No gallop. No click. No rub.   Pulses:     Intact distal pulses.   Edema:     Thigh: bilateral 1+ edema of the thigh.     Pretibial: bilateral 1+ edema of the pretibial area.     Ankle: bilateral 1+ edema of the ankle.     Feet: bilateral 1+ edema of the feet.  Abdominal:      General: Bowel sounds are normal.      Palpations: Abdomen is soft.      Tenderness: There is no " abdominal tenderness.   Musculoskeletal: Normal range of motion.         General: No tenderness. Skin:     General: Skin is warm and dry.   Neurological:      General: No focal deficit present.      Mental Status: Alert and oriented to person, place and time.          Diagnostic Data    ECG 12 Lead    Date/Time: 5/10/2022 10:24 AM  Performed by: Sterling Ramon MD  Authorized by: Sterling Ramon MD   Comparison: compared with previous ECG from 4/27/2022  Comparison to previous ECG: Now AF  Rhythm: atrial fibrillation  Rate: normal  BPM: 85  QRS axis: left    Clinical impression: abnormal EKG            Assessment and Plan  Diagnoses and all orders for this visit:    Acute on chronic diastolic CHF (congestive heart failure) (HCC)- on Torsemide , will increase to 100 twice a week and 50 the others day   -     Basic Metabolic Panel  -     proBNP    Atrial fibrillation, chronic (HCC)- Rate control Hr 85 on coreg 12,5 bid , will increase to 25 bid  -     proBNP    Essential hypertension- the BP is 130.80 on Coreg  And Torsemide    Hyperlipidemia LDL goal <100- On Lipitor         No follow-ups on file.    Sterling Ramon MD  05/10/2022

## 2022-05-11 LAB
BUN SERPL-MCNC: 48 MG/DL (ref 10–36)
BUN/CREAT SERPL: 20 (ref 12–28)
CALCIUM SERPL-MCNC: 9.1 MG/DL (ref 8.7–10.3)
CHLORIDE SERPL-SCNC: 105 MMOL/L (ref 96–106)
CO2 SERPL-SCNC: 22 MMOL/L (ref 20–29)
CREAT SERPL-MCNC: 2.39 MG/DL (ref 0.57–1)
EGFRCR SERPLBLD CKD-EPI 2021: 19 ML/MIN/1.73
GLUCOSE SERPL-MCNC: 82 MG/DL (ref 65–99)
NT-PROBNP SERPL-MCNC: 4088 PG/ML (ref 0–738)
POTASSIUM SERPL-SCNC: 4.8 MMOL/L (ref 3.5–5.2)
SODIUM SERPL-SCNC: 145 MMOL/L (ref 134–144)

## 2022-05-12 ENCOUNTER — TELEPHONE (OUTPATIENT)
Dept: CARDIOLOGY | Facility: CLINIC | Age: 87
End: 2022-05-12

## 2022-05-12 NOTE — TELEPHONE ENCOUNTER
Spoke with Ms. Hernandez's daughter, Grace, and advised her that the creatinine was down to 2.3 from a 2.7 so Dr. Ramon would like her to take the Torsemide as follows: 100mg on Mondays and Fridays and 50mg other days. Grace verbalized understanding.

## 2022-05-20 ENCOUNTER — OFFICE VISIT (OUTPATIENT)
Dept: FAMILY MEDICINE CLINIC | Facility: CLINIC | Age: 87
End: 2022-05-20

## 2022-05-20 VITALS
HEART RATE: 86 BPM | SYSTOLIC BLOOD PRESSURE: 116 MMHG | OXYGEN SATURATION: 97 % | HEIGHT: 62 IN | BODY MASS INDEX: 34.39 KG/M2 | DIASTOLIC BLOOD PRESSURE: 70 MMHG

## 2022-05-20 DIAGNOSIS — J40 BRONCHITIS: Primary | ICD-10-CM

## 2022-05-20 PROCEDURE — 99213 OFFICE O/P EST LOW 20 MIN: CPT | Performed by: PHYSICIAN ASSISTANT

## 2022-05-20 RX ORDER — DEXTROMETHORPHAN HYDROBROMIDE AND PROMETHAZINE HYDROCHLORIDE 15; 6.25 MG/5ML; MG/5ML
5 SOLUTION ORAL 4 TIMES DAILY PRN
Qty: 150 ML | Refills: 0 | Status: SHIPPED | OUTPATIENT
Start: 2022-05-20 | End: 2022-07-28

## 2022-05-20 RX ORDER — DOXYCYCLINE 100 MG/1
100 CAPSULE ORAL 2 TIMES DAILY
Qty: 20 CAPSULE | Refills: 0 | Status: SHIPPED | OUTPATIENT
Start: 2022-05-20 | End: 2022-07-28

## 2022-05-20 RX ORDER — METHYLPREDNISOLONE 4 MG/1
TABLET ORAL
Qty: 21 TABLET | Refills: 0 | Status: SHIPPED | OUTPATIENT
Start: 2022-05-20 | End: 2022-07-28

## 2022-05-20 RX ORDER — LORATADINE 10 MG/1
10 TABLET ORAL DAILY
COMMUNITY
End: 2022-05-24 | Stop reason: SDUPTHER

## 2022-05-20 NOTE — ASSESSMENT & PLAN NOTE
I am prescribing Doxycycline, Medrol dose pack, and Promethazine Dm, I also told them she could resume her plain mucinex.  Return as scheduled next week with Jossie, call if any problems occur or if she does not imrove.

## 2022-05-20 NOTE — PROGRESS NOTES
"Chief Complaint  Cough (X1 wk)    Subjective          Rena Hernandez presents to [facility] PRIMARY CARE  History of Present Illness  Daughter reports they were in the ER on 5/16 and she had pulmonary edema, covid test was negative, fluid meds were changed and her weight is down, usually this will reduce her cough but it has not improved with the improvement in fluid status so the daughter is concerned she may have pneumonia.  The CXR in the ER showed only pulmonary edema.  The patient did improve with a nebulizer treatment in the ER.    Objective     Vital Signs:   /70 (BP Location: Left arm, Patient Position: Sitting, Cuff Size: Adult)   Pulse 86   Ht 157.5 cm (62\")   SpO2 97%   BMI 34.39 kg/m²     Body mass index is 34.39 kg/m².    Review of Systems   Constitutional: Negative for fever.   Respiratory: Positive for cough and wheezing. Negative for chest tightness and shortness of breath.    Cardiovascular: Positive for leg swelling. Negative for chest pain and palpitations.   Psychiatric/Behavioral: Negative.        Social History: reports that she has never smoked. She has never used smokeless tobacco. She reports that she does not drink alcohol and does not use drugs.      Current Outpatient Medications:   •  apixaban (ELIQUIS) 2.5 MG tablet tablet, Take 1 tablet by mouth Every 12 (Twelve) Hours., Disp: 180 tablet, Rfl: 3  •  atorvastatin (Lipitor) 40 MG tablet, Take 1 tablet by mouth Daily., Disp: 90 tablet, Rfl: 3  •  BD Insulin Syringe U/F 31G X 5/16\" 0.5 ML misc, , Disp: , Rfl:   •  calcium carbonate (OS-CHERRI) 1250 (500 Ca) MG tablet, Take 1 tablet by mouth Daily., Disp: , Rfl:   •  carvedilol (COREG) 25 MG tablet, Take 1 tablet by mouth 2 (Two) Times a Day., Disp: 180 tablet, Rfl: 3  •  docusate sodium (COLACE) 100 MG capsule, Take 100 mg by mouth 2 (Two) Times a Day., Disp: , Rfl:   •  famotidine (PEPCID) 20 MG tablet, Take 20 mg by mouth 2 (Two) Times a Day., Disp: , Rfl:   •  " Ferrous Fumarate 150 MG tablet, Take  by mouth., Disp: , Rfl:   •  fluticasone (FLONASE) 50 MCG/ACT nasal spray, , Disp: , Rfl:   •  Lantus 100 UNIT/ML injection, 100 Units. Take as directed., Disp: , Rfl:   •  loratadine (Claritin) 10 MG tablet, Take 10 mg by mouth Daily., Disp: , Rfl:   •  torsemide (DEMADEX) 100 MG tablet, Take 50 mg by mouth Take As Directed., Disp: , Rfl:   •  True Metrix Blood Glucose Test test strip, , Disp: , Rfl:   •  doxycycline (MONODOX) 100 MG capsule, Take 1 capsule by mouth 2 (Two) Times a Day., Disp: 20 capsule, Rfl: 0  •  guaiFENesin (MUCINEX) 600 MG 12 hr tablet, Take 600 mg by mouth 2 (Two) Times a Day., Disp: , Rfl:   •  methylPREDNISolone (MEDROL) 4 MG dose pack, Take as directed on package instructions., Disp: 21 tablet, Rfl: 0  •  montelukast (SINGULAIR) 10 MG tablet, Take 10 mg by mouth Every Night., Disp: , Rfl:   •  promethazine-dextromethorphan (PROMETHAZINE-DM) 6.25-15 MG/5ML solution, Take 5 mL by mouth 4 (Four) Times a Day As Needed for Cough., Disp: 150 mL, Rfl: 0    Allergies: Ibuprofen    Physical Exam  Vitals reviewed.   Constitutional:       Appearance: She is ill-appearing.   Cardiovascular:      Rate and Rhythm: Normal rate and regular rhythm.      Heart sounds: Normal heart sounds.   Pulmonary:      Effort: Pulmonary effort is normal. No respiratory distress.      Breath sounds: Wheezing and rhonchi present. No rales.   Neurological:      Mental Status: She is alert. Mental status is at baseline.   Psychiatric:         Behavior: Behavior normal.             Assessment and Plan   Diagnoses and all orders for this visit:    1. Bronchitis (Primary)  Assessment & Plan:  I am prescribing Doxycycline, Medrol dose pack, and Promethazine Dm, I also told them she could resume her plain mucinex.  Return as scheduled next week with Jossie, call if any problems occur or if she does not imrove.       Other orders  -     methylPREDNISolone (MEDROL) 4 MG dose pack; Take as  directed on package instructions.  Dispense: 21 tablet; Refill: 0  -     promethazine-dextromethorphan (PROMETHAZINE-DM) 6.25-15 MG/5ML solution; Take 5 mL by mouth 4 (Four) Times a Day As Needed for Cough.  Dispense: 150 mL; Refill: 0  -     doxycycline (MONODOX) 100 MG capsule; Take 1 capsule by mouth 2 (Two) Times a Day.  Dispense: 20 capsule; Refill: 0          Follow Up   Return in about 2 years (around 5/20/2024) for Recheck bronchitis.  Patient was given instructions and counseling regarding her condition or for health maintenance advice. Please see specific information pulled into the AVS if appropriate.     Dena Jimenez PA-C

## 2022-05-24 ENCOUNTER — OFFICE VISIT (OUTPATIENT)
Dept: FAMILY MEDICINE CLINIC | Facility: CLINIC | Age: 87
End: 2022-05-24

## 2022-05-24 VITALS
HEART RATE: 107 BPM | DIASTOLIC BLOOD PRESSURE: 60 MMHG | WEIGHT: 184 LBS | HEIGHT: 62 IN | OXYGEN SATURATION: 98 % | BODY MASS INDEX: 33.86 KG/M2 | SYSTOLIC BLOOD PRESSURE: 114 MMHG

## 2022-05-24 DIAGNOSIS — N18.4 TYPE 2 DIABETES MELLITUS WITH STAGE 4 CHRONIC KIDNEY DISEASE, WITH LONG-TERM CURRENT USE OF INSULIN: ICD-10-CM

## 2022-05-24 DIAGNOSIS — D50.9 CHRONIC IRON DEFICIENCY ANEMIA: ICD-10-CM

## 2022-05-24 DIAGNOSIS — E11.22 TYPE 2 DIABETES MELLITUS WITH STAGE 4 CHRONIC KIDNEY DISEASE, WITH LONG-TERM CURRENT USE OF INSULIN: ICD-10-CM

## 2022-05-24 DIAGNOSIS — K59.09 CHRONIC CONSTIPATION: ICD-10-CM

## 2022-05-24 DIAGNOSIS — J40 BRONCHITIS: ICD-10-CM

## 2022-05-24 DIAGNOSIS — I48.20 ATRIAL FIBRILLATION, CHRONIC: Primary | ICD-10-CM

## 2022-05-24 DIAGNOSIS — J30.2 SEASONAL ALLERGIES: ICD-10-CM

## 2022-05-24 DIAGNOSIS — Z79.4 TYPE 2 DIABETES MELLITUS WITH STAGE 4 CHRONIC KIDNEY DISEASE, WITH LONG-TERM CURRENT USE OF INSULIN: ICD-10-CM

## 2022-05-24 DIAGNOSIS — K21.9 CHRONIC GERD: ICD-10-CM

## 2022-05-24 PROCEDURE — 99214 OFFICE O/P EST MOD 30 MIN: CPT | Performed by: PHYSICIAN ASSISTANT

## 2022-05-24 RX ORDER — PEN NEEDLE, DIABETIC 29 G X1/2"
NEEDLE, DISPOSABLE MISCELLANEOUS
Qty: 100 EACH | Refills: 12 | Status: SHIPPED | OUTPATIENT
Start: 2022-05-24

## 2022-05-24 RX ORDER — DOCUSATE SODIUM 100 MG/1
100 CAPSULE, LIQUID FILLED ORAL 2 TIMES DAILY
Qty: 180 CAPSULE | Refills: 1 | Status: SHIPPED | OUTPATIENT
Start: 2022-05-24

## 2022-05-24 RX ORDER — INSULIN GLARGINE 100 [IU]/ML
25 INJECTION, SOLUTION SUBCUTANEOUS DAILY
Qty: 30 ML | Refills: 3 | Status: SHIPPED | OUTPATIENT
Start: 2022-05-24

## 2022-05-24 RX ORDER — FLUTICASONE PROPIONATE 50 MCG
SPRAY, SUSPENSION (ML) NASAL
Qty: 15.8 ML | Refills: 5 | Status: SHIPPED | OUTPATIENT
Start: 2022-05-24

## 2022-05-24 RX ORDER — LANCETS
EACH MISCELLANEOUS
Qty: 100 EACH | Refills: 12 | Status: SHIPPED | OUTPATIENT
Start: 2022-05-24

## 2022-05-24 RX ORDER — FAMOTIDINE 20 MG/1
20 TABLET, FILM COATED ORAL 2 TIMES DAILY
Qty: 180 TABLET | Refills: 1 | Status: SHIPPED | OUTPATIENT
Start: 2022-05-24

## 2022-05-24 RX ORDER — CALCIUM CITRATE/VITAMIN D3 200MG-6.25
TABLET ORAL
Qty: 100 EACH | Refills: 10 | Status: SHIPPED | OUTPATIENT
Start: 2022-05-24

## 2022-05-24 RX ORDER — LORATADINE 10 MG/1
10 TABLET ORAL DAILY
Qty: 90 TABLET | Refills: 1 | Status: SHIPPED | OUTPATIENT
Start: 2022-05-24

## 2022-06-23 RX ORDER — TORSEMIDE 100 MG/1
TABLET ORAL
Qty: 30 TABLET | Refills: 1 | Status: SHIPPED | OUTPATIENT
Start: 2022-06-23 | End: 2022-09-12

## 2022-07-28 ENCOUNTER — TELEPHONE (OUTPATIENT)
Dept: CARDIOLOGY | Facility: CLINIC | Age: 87
End: 2022-07-28

## 2022-07-28 ENCOUNTER — OFFICE VISIT (OUTPATIENT)
Dept: FAMILY MEDICINE CLINIC | Facility: CLINIC | Age: 87
End: 2022-07-28

## 2022-07-28 VITALS
WEIGHT: 184 LBS | OXYGEN SATURATION: 100 % | BODY MASS INDEX: 33.86 KG/M2 | HEIGHT: 62 IN | SYSTOLIC BLOOD PRESSURE: 132 MMHG | DIASTOLIC BLOOD PRESSURE: 84 MMHG | HEART RATE: 105 BPM

## 2022-07-28 DIAGNOSIS — R06.02 SHORTNESS OF BREATH: Primary | ICD-10-CM

## 2022-07-28 PROCEDURE — 99213 OFFICE O/P EST LOW 20 MIN: CPT | Performed by: PHYSICIAN ASSISTANT

## 2022-07-28 NOTE — TELEPHONE ENCOUNTER
Caller: ROSENDO    Relationship: DAUGHTER    Best call back number: [phone]    What is the best time to reach you: ANYTIME    Who are you requesting to speak with (clinical staff, provider,  specific staff member): CLINICAL STAFF      What was the call regarding: SAW PCP AND SUGGESTED TO MAYBE CHANGE MEDICINE. WEIGHT GAIN OVERNIGHT, CRACKLING IN THE LUNGS, SOB... PLEASE ADVISE    Do you require a callback: YES

## 2022-07-29 ENCOUNTER — OFFICE VISIT (OUTPATIENT)
Dept: CARDIOLOGY | Facility: CLINIC | Age: 87
End: 2022-07-29

## 2022-07-29 VITALS
TEMPERATURE: 96.9 F | RESPIRATION RATE: 20 BRPM | DIASTOLIC BLOOD PRESSURE: 61 MMHG | WEIGHT: 192 LBS | HEIGHT: 62 IN | OXYGEN SATURATION: 97 % | HEART RATE: 114 BPM | BODY MASS INDEX: 35.33 KG/M2 | SYSTOLIC BLOOD PRESSURE: 102 MMHG

## 2022-07-29 DIAGNOSIS — I50.33 ACUTE ON CHRONIC DIASTOLIC CHF (CONGESTIVE HEART FAILURE): ICD-10-CM

## 2022-07-29 DIAGNOSIS — E78.5 HYPERLIPIDEMIA LDL GOAL <100: ICD-10-CM

## 2022-07-29 DIAGNOSIS — I48.20 ATRIAL FIBRILLATION, CHRONIC: ICD-10-CM

## 2022-07-29 DIAGNOSIS — I10 ESSENTIAL HYPERTENSION: ICD-10-CM

## 2022-07-29 DIAGNOSIS — R06.02 SOB (SHORTNESS OF BREATH): Primary | ICD-10-CM

## 2022-07-29 PROCEDURE — 93000 ELECTROCARDIOGRAM COMPLETE: CPT | Performed by: INTERNAL MEDICINE

## 2022-07-29 PROCEDURE — 99214 OFFICE O/P EST MOD 30 MIN: CPT | Performed by: INTERNAL MEDICINE

## 2022-07-29 RX ORDER — CARVEDILOL 25 MG/1
TABLET ORAL
Qty: 270 TABLET | Refills: 3 | Status: SHIPPED | OUTPATIENT
Start: 2022-07-29 | End: 2022-09-27 | Stop reason: SDUPTHER

## 2022-07-29 RX ORDER — LEVOFLOXACIN 500 MG/1
500 TABLET, FILM COATED ORAL DAILY
Qty: 7 TABLET | Refills: 0 | Status: SHIPPED | OUTPATIENT
Start: 2022-07-29 | End: 2022-09-27 | Stop reason: ALTCHOICE

## 2022-07-29 NOTE — TELEPHONE ENCOUNTER
Spoke with daughter, Grace, and she advised that Ms. Hernandez was seen yesterday by Stephania for soa, crackling in lungs, and wt gain.  They were advised to call Dr. Ramon for f/u.  Daughter indicates no chest xray done. She states she can have the Ms. Hernandez there for a 9:45 appointment.  I asked them to stop at Primary office for a chest xray. She advised she would.

## 2022-07-29 NOTE — ASSESSMENT & PLAN NOTE
Patients oxygen 100% and lung auscultation WNLs.  Advised patient this could be secondary to cardiac issue.  Patient daughter will contact cardiology discussed signs of worsening symptoms and advised ER should they occur.

## 2022-07-29 NOTE — PROGRESS NOTES
"Chief Complaint  Shortness of Breath (Patient started reports she has been coughing and short of breath)    Subjective        Rena Hernandez presents to [facility] PRIMARY [facility]  Patient reprots today with her daughter secondary to having cough and SOB for the past week.  Patients daughter reports giving allergy medication however no relief.  Patient daughter states the patient has gained 10lbs over the past week.      Shortness of Breath        Objective   Vital Signs:  /84 (BP Location: Right arm, Patient Position: Sitting, Cuff Size: Adult)   Pulse 105   Ht 157.5 cm (62\")   Wt 83.5 kg (184 lb)   SpO2 100%   BMI 33.65 kg/m²   Estimated body mass index is 33.65 kg/m² as calculated from the following:    Height as of this encounter: 157.5 cm (62\").    Weight as of this encounter: 83.5 kg (184 lb).          Physical Exam  Vitals and nursing note reviewed.   Constitutional:       General: She is not in acute distress.     Appearance: Normal appearance.   HENT:      Head: Normocephalic.      Right Ear: Hearing normal.      Left Ear: Hearing normal.   Eyes:      Pupils: Pupils are equal, round, and reactive to light.   Cardiovascular:      Rate and Rhythm: Rhythm irregularly irregular.   Pulmonary:      Effort: Pulmonary effort is normal. No respiratory distress.      Breath sounds: Normal breath sounds. No stridor. No wheezing or rales.   Skin:     General: Skin is warm and dry.   Neurological:      Mental Status: She is alert.   Psychiatric:         Mood and Affect: Mood normal.        Result Review :                Assessment and Plan   Diagnoses and all orders for this visit:    1. Shortness of breath (Primary)  Assessment & Plan:  Patients oxygen 100% and lung auscultation WNLs.  Advised patient this could be secondary to cardiac issue.  Patient daughter will contact cardiology discussed signs of worsening symptoms and advised ER should they occur.             Follow Up   No follow-ups " on file.  Patient was given instructions and counseling regarding her condition or for health maintenance advice. Please see specific information pulled into the AVS if appropriate.

## 2022-07-29 NOTE — PROGRESS NOTES
[facility]  [facility] CARDIOLOGY  [address] [place] [address]  Dept: [phone]  Dept Fax: [phone]    Rena Hernandez  11/23/1931    Follow Up Office Visit Note    History of Present Illness:  Rena Hernandez is a 89 y.o. female who presents to the clinic for Follow-up. Diastolic heart failure- She seems having more SOB some cough and gained weight, but no fever, chest XR showed left lower lowe infiltrate, we have given her 100 Torsemide twice a week and also 50 mg the others 5 days due to worsening of renal failure, she does not have majoir edema, BP is 100.60, . Will offer her Levaquin, will get a CBC and also BMP    The following portions of the patient's history were reviewed and updated as appropriate: allergies, current medications, past family history, past medical history, past social history, past surgical history and problem list.    Medications:  apixaban tablet  atorvastatin  BD Insulin Syringe U/F misc  calcium carbonate  carvedilol  docusate sodium  famotidine  Ferrous Fumarate tablet  fluticasone  guaiFENesin  Lantus solution  levoFLOXacin  loratadine  onetouch ultrasoft  torsemide  True Metrix Blood Glucose Test strip    Subjective  Allergies   Allergen Reactions   • Ibuprofen Other (See Comments)     Kidney bleed        Past Medical History:   Diagnosis Date   • A-fib (HCC)    • Body mass index 30.0-30.9, adult    • Carotid artery disease (HCC)    • Chest pain    • CHF (congestive heart failure) ([facility])    • Chronic atrial fibrillation, unspecified ([facility])    • Diabetes mellitus due to underlying condition ([facility])    • Diastolic heart failure (HCC)     She is doing well, no edema, no major SOb on 100.50 Torsemide alternating.   • Dyspnea    • Edema    • Hyperlipidemia    • Hypertension    • Mesenteric ischemia (HCC)    • Renal disease    • Syncope    • Ventricular tachycardia ([facility])        Past Surgical History:   Procedure Laterality Date   • ABDOMINAL AORTIC  "ANEURYSM REPAIR         Family History   Family history unknown: Yes        Social History     Socioeconomic History   • Marital status:    Tobacco Use   • Smoking status: Never Smoker   • Smokeless tobacco: Never Used   Vaping Use   • Vaping Use: Never used   Substance and Sexual Activity   • Alcohol use: Never   • Drug use: Never   • Sexual activity: Defer       Review of Systems   Constitutional: Negative.    HENT: Negative.    Respiratory: Positive for cough and shortness of breath.    Cardiovascular: Positive for leg swelling.   Endocrine: Negative.    Genitourinary: Negative.    Musculoskeletal: Negative.    Skin: Negative.    Allergic/Immunologic: Negative.    Neurological: Negative.    Hematological: Negative.    Psychiatric/Behavioral: Negative.        Cardiovascular Procedures    ECHO/MUGA:   STRESS TESTS:   CARDIAC CATH:   DEVICES:   HOLTER:   CT/MRI:   VASCULAR:   CARDIOTHORACIC:     Objective  Vitals:    07/29/22 1000   BP: 102/61   BP Location: Right arm   Patient Position: Sitting   Cuff Size: Adult   Pulse: 114   Resp: 20   Temp: 96.9 °F (36.1 °C)   TempSrc: Temporal   SpO2: 97%   Weight: 87.1 kg (192 lb)   Height: 157.5 cm (62\")   PainSc: 0-No pain     Body mass index is 35.12 kg/m².     Physical Exam  Constitutional:       Appearance: Healthy appearance. Not in distress.   Neck:      Vascular: No JVR. JVD normal.   Pulmonary:      Effort: Pulmonary effort is normal.      Breath sounds: Normal breath sounds. No wheezing. No rhonchi. No rales.   Chest:      Chest wall: Not tender to palpatation.   Cardiovascular:      PMI at left midclavicular line. Normal rate. Irregularly irregular rhythm. Normal S1. Normal S2.      Murmurs: There is no murmur.      No gallop. No click. No rub.   Pulses:     Intact distal pulses.   Edema:     Peripheral edema absent.   Abdominal:      General: Bowel sounds are normal.      Palpations: Abdomen is soft.      Tenderness: There is no abdominal tenderness. "   Musculoskeletal: Normal range of motion.         General: No tenderness. Skin:     General: Skin is warm and dry.   Neurological:      General: No focal deficit present.      Mental Status: Alert and oriented to person, place and time.          Diagnostic Data    ECG 12 Lead    Date/Time: 7/29/2022 12:37 PM  Performed by: Sterling Ramon MD  Authorized by: Sterling Ramon MD   Comparison: compared with previous ECG from 5/10/2022  Rhythm: atrial fibrillation  Rate: normal  BPM: 91  QRS axis: normal    Clinical impression: abnormal EKG            Assessment and Plan  Diagnoses and all orders for this visit:    SOB (shortness of breath)- Cxr consistent with left lower lobe infiltrate, can not excluded fluids, but no edema, will give her Levaquin  -     XR Chest 2 View  -     CBC & Differential  -     Comprehensive Metabolic Panel    Acute on chronic diastolic CHF (congestive heart failure) (HCC)- On torsemide 100-50     2/5 days ,w ill get lab, has gained 6 pounds but no edema   -     CBC & Differential  -     Comprehensive Metabolic Panel    Atrial fibrillation, chronic (HCC)- rate control still fast will increase Coreg to 37,5 bid,  -     carvedilol (COREG) 25 MG tablet; Take 37,5 bid    Essential hypertension- The BP is borderline only on Coreg plus diuretics  -     carvedilol (COREG) 25 MG tablet; Take 37,5 bid    Hyperlipidemia LDL goal <100- on lipitor    Other orders  -     levoFLOXacin (Levaquin) 500 MG tablet; Take 1 tablet by mouth Daily.         Return for Recheck.    Sterling Ramon MD  07/29/2022

## 2022-07-30 LAB
ALBUMIN SERPL-MCNC: 3.6 G/DL (ref 3.5–4.6)
ALBUMIN/GLOB SERPL: 1.5 {RATIO} (ref 1.2–2.2)
ALP SERPL-CCNC: 101 IU/L (ref 44–121)
ALT SERPL-CCNC: 9 IU/L (ref 0–32)
AST SERPL-CCNC: 13 IU/L (ref 0–40)
BASOPHILS # BLD AUTO: 0 X10E3/UL (ref 0–0.2)
BASOPHILS NFR BLD AUTO: 1 %
BILIRUB SERPL-MCNC: 0.3 MG/DL (ref 0–1.2)
BUN SERPL-MCNC: 48 MG/DL (ref 10–36)
BUN/CREAT SERPL: 21 (ref 12–28)
CALCIUM SERPL-MCNC: 8.8 MG/DL (ref 8.7–10.3)
CHLORIDE SERPL-SCNC: 103 MMOL/L (ref 96–106)
CO2 SERPL-SCNC: 23 MMOL/L (ref 20–29)
CREAT SERPL-MCNC: 2.29 MG/DL (ref 0.57–1)
EGFRCR SERPLBLD CKD-EPI 2021: 20 ML/MIN/1.73
EOSINOPHIL # BLD AUTO: 0.2 X10E3/UL (ref 0–0.4)
EOSINOPHIL NFR BLD AUTO: 4 %
ERYTHROCYTE [DISTWIDTH] IN BLOOD BY AUTOMATED COUNT: 14.3 % (ref 11.7–15.4)
GLOBULIN SER CALC-MCNC: 2.4 G/DL (ref 1.5–4.5)
GLUCOSE SERPL-MCNC: 165 MG/DL (ref 65–99)
HCT VFR BLD AUTO: 30 % (ref 34–46.6)
HGB BLD-MCNC: 9.5 G/DL (ref 11.1–15.9)
IMM GRANULOCYTES # BLD AUTO: 0 X10E3/UL (ref 0–0.1)
IMM GRANULOCYTES NFR BLD AUTO: 0 %
LYMPHOCYTES # BLD AUTO: 1.2 X10E3/UL (ref 0.7–3.1)
LYMPHOCYTES NFR BLD AUTO: 22 %
MCH RBC QN AUTO: 30.5 PG (ref 26.6–33)
MCHC RBC AUTO-ENTMCNC: 31.7 G/DL (ref 31.5–35.7)
MCV RBC AUTO: 97 FL (ref 79–97)
MONOCYTES # BLD AUTO: 0.4 X10E3/UL (ref 0.1–0.9)
MONOCYTES NFR BLD AUTO: 8 %
NEUTROPHILS # BLD AUTO: 3.5 X10E3/UL (ref 1.4–7)
NEUTROPHILS NFR BLD AUTO: 65 %
PLATELET # BLD AUTO: 204 X10E3/UL (ref 150–450)
POTASSIUM SERPL-SCNC: 4.9 MMOL/L (ref 3.5–5.2)
PROT SERPL-MCNC: 6 G/DL (ref 6–8.5)
RBC # BLD AUTO: 3.11 X10E6/UL (ref 3.77–5.28)
SODIUM SERPL-SCNC: 143 MMOL/L (ref 134–144)
WBC # BLD AUTO: 5.3 X10E3/UL (ref 3.4–10.8)

## 2022-08-04 ENCOUNTER — TELEPHONE (OUTPATIENT)
Dept: CARDIOLOGY | Facility: CLINIC | Age: 87
End: 2022-08-04

## 2022-08-04 NOTE — TELEPHONE ENCOUNTER
PT HAD MENTAL  CHANGES WHEN TAKING THE COREG 25 + 1/2. SO DAUGHTER TOOK AWAY HER 1/2 PILL AND THINGS GOT BETTER. BLOOD PRESSURE 111/65 P 91. LAST NIGHT 115/55 P 93 AND 8/3 IN AM /56 P101 . PT FELL OUT OF CHAIR AND WENT TO ER . PT IS SEEING PCP TOMORROW FOR FALL AND MENTAL CHANGES. ANDERSON PICHARDO TOMORROW . PT SAID SHE WILL TELL HER EVERYTHING TOO . PLEASE ADVISE?

## 2022-08-04 NOTE — TELEPHONE ENCOUNTER
Caller: ROSENDO ESTEVES    Relationship to patient: Emergency Contact    Best call back number: [phone]    Patient is needing: REPORT NEW MEDICATION, HAVING SIDE EFFECTS DUE TO INCREASE AND DOSAGE CHANGE.

## 2022-08-05 ENCOUNTER — OFFICE VISIT (OUTPATIENT)
Dept: FAMILY MEDICINE CLINIC | Facility: CLINIC | Age: 87
End: 2022-08-05

## 2022-08-05 VITALS
DIASTOLIC BLOOD PRESSURE: 70 MMHG | HEIGHT: 62 IN | WEIGHT: 192 LBS | SYSTOLIC BLOOD PRESSURE: 110 MMHG | BODY MASS INDEX: 35.33 KG/M2

## 2022-08-05 DIAGNOSIS — I48.20 ATRIAL FIBRILLATION, CHRONIC: ICD-10-CM

## 2022-08-05 DIAGNOSIS — Z79.899 REFERRED FOR MEDICATION THERAPY MANAGEMENT: ICD-10-CM

## 2022-08-05 DIAGNOSIS — N18.4 TYPE 2 DIABETES MELLITUS WITH STAGE 4 CHRONIC KIDNEY DISEASE, WITH LONG-TERM CURRENT USE OF INSULIN: ICD-10-CM

## 2022-08-05 DIAGNOSIS — R41.0 CONFUSION AND DISORIENTATION: ICD-10-CM

## 2022-08-05 DIAGNOSIS — F41.1 GAD (GENERALIZED ANXIETY DISORDER): ICD-10-CM

## 2022-08-05 DIAGNOSIS — E11.22 TYPE 2 DIABETES MELLITUS WITH STAGE 4 CHRONIC KIDNEY DISEASE, WITH LONG-TERM CURRENT USE OF INSULIN: ICD-10-CM

## 2022-08-05 DIAGNOSIS — G47.00 PERSISTENT INSOMNIA: ICD-10-CM

## 2022-08-05 DIAGNOSIS — S81.811D SKIN TEAR OF RIGHT LOWER LEG WITHOUT COMPLICATION, SUBSEQUENT ENCOUNTER: Primary | ICD-10-CM

## 2022-08-05 DIAGNOSIS — Z79.4 TYPE 2 DIABETES MELLITUS WITH STAGE 4 CHRONIC KIDNEY DISEASE, WITH LONG-TERM CURRENT USE OF INSULIN: ICD-10-CM

## 2022-08-05 PROBLEM — S81.811A SKIN TEAR OF RIGHT LOWER LEG WITHOUT COMPLICATION: Status: ACTIVE | Noted: 2022-08-05

## 2022-08-05 PROCEDURE — 99214 OFFICE O/P EST MOD 30 MIN: CPT | Performed by: PHYSICIAN ASSISTANT

## 2022-08-05 RX ORDER — MIRTAZAPINE 7.5 MG/1
7.5 TABLET, FILM COATED ORAL NIGHTLY
Qty: 30 TABLET | Refills: 1 | Status: SHIPPED | OUTPATIENT
Start: 2022-08-05 | End: 2022-10-10

## 2022-08-05 NOTE — ASSESSMENT & PLAN NOTE
Reviewed patient's fasting blood sugar log and she does have some elevations however for the most part it seems that her fasting blood sugars are running around 1 60-1 80.  Advised patient's daughter to continue to adjust as discussed previously

## 2022-08-05 NOTE — PROGRESS NOTES
Chief Complaint  hosptial follow up (Patient reports that she was in the [facility] for a recent fall, right arm and leg )    Subjective    {Problem List  Visit Diagnosis   Encounters  Notes  Medications  Labs  Result Review Imaging  Media :23}    Rena Hernandez presents to [facility] PR[facility]ARE  Patient reports today for follow-up secondary to falling out of her wheelchair on Sunday.  Patient reports she does not recall falling and does not remember if she hit anything on her way down.  Patient's daughter reports walking to the kitchen and seeing her on the floor reports she took her to the ER.  Patient's daughter also reports that her x-rays and scans were normal.  Patient's daughter states she has Steri-Strips and Tegaderm on arm and leg wounds.    Patient reports she has difficulty sleeping at night states she lays down at around 11 however does not get to sleep until 3 or 4 in the morning.  Patient's daughter reports she is also starting to have visual and auditory hallucinations.  She reports patient becomes anxious throughout the day and is unable to rest.  She also states patient becomes confused easily.    Patient's daughter reports her cardiologist increased carvedilol to 37.5 mg twice daily.  She states when she increased the patient's medication the patient became more sluggish, disoriented and irritable.  She reports she decreased the medication to 25 mg twice daily and she now feels fine and her pulse is below 100.  She would like to know if she should continue the medication.  Reports she does have a call into patient's cardiology.    Patient reports she has been monitoring her fasting blood sugars more frequently.  Patient's daughter reports there are some elevations however reports they are secondary to diet.    Patient's daughter reports she has been her sole caregiver for the past 6 years and states she needs a break.  Reports she has had respite care in the past however was  "not consistent.      Objective   Vital Signs:  /70 (BP Location: Right arm, Patient Position: Sitting, Cuff Size: Adult)   Ht 157.5 cm (62\")   Wt 87.1 kg (192 lb)   BMI 35.12 kg/m²   Estimated body mass index is 35.12 kg/m² as calculated from the following:    Height as of this encounter: 157.5 cm (62\").    Weight as of this encounter: 87.1 kg (192 lb).          Physical Exam  Vitals and nursing note reviewed.   Constitutional:       General: She is not in acute distress.     Appearance: Normal appearance. She is ill-appearing.   HENT:      Head: Normocephalic.      Right Ear: Hearing normal.      Left Ear: Hearing normal.   Eyes:      Pupils: Pupils are equal, round, and reactive to light.   Cardiovascular:      Rate and Rhythm: Normal rate and regular rhythm.   Pulmonary:      Effort: Pulmonary effort is normal. No respiratory distress.   Skin:     General: Skin is warm and dry.      Comments: Patient with skin tear on right elbow and right lower leg.  Areas are covered with Tegaderm.  Right lower leg has some edema slight erythema no increased warmth.  Both areas are tender to touch   Neurological:      Mental Status: She is alert.   Psychiatric:         Mood and Affect: Mood normal.        Result Review :                Assessment and Plan   Diagnoses and all orders for this visit:    1. Skin tear of right lower leg without complication, subsequent encounter (Primary)  Assessment & Plan:  Patient provided with referral to home health    Orders:  -     Ambulatory Referral to Home Health    2. Referred for medication therapy management  Assessment & Plan:  Patient provided with referral to home health for medication management of diabetes and hypertension    Orders:  -     Ambulatory Referral to Home Health    3. ABBY (generalized anxiety disorder)  Assessment & Plan:  Psychological condition is newly identified.  Medication changes per orders.  Psychological condition  will be reassessed in 4 " weeks.    Orders:  -     mirtazapine (REMERON) 7.5 MG tablet; Take 1 tablet by mouth Every Night. For anxiety and sleep, caution sedation  Dispense: 30 tablet; Refill: 1    4. Persistent insomnia  Assessment & Plan:  See plan above    Orders:  -     mirtazapine (REMERON) 7.5 MG tablet; Take 1 tablet by mouth Every Night. For anxiety and sleep, caution sedation  Dispense: 30 tablet; Refill: 1    5. Confusion and disorientation  Assessment & Plan:  See plan above      6. Type 2 diabetes mellitus with stage 4 chronic kidney disease, with long-term current use of insulin (HCC)  Assessment & Plan:  Reviewed patient's fasting blood sugar log and she does have some elevations however for the most part it seems that her fasting blood sugars are running around 1 60-1 80.  Advised patient's daughter to continue to adjust as discussed previously    Orders:  -     Ambulatory Referral to Home Health    7. Atrial fibrillation, chronic (HCC)  Assessment & Plan:  Advised patient daughter to continue 25 mg twice daily as her log is within normal limits and she reports the patient tolerates the lower dose better.  Patient reports she has a call into cardiology for further follow-up    Orders:  -     Ambulatory Referral to Home Health           Follow Up   No follow-ups on file.  Patient was given instructions and counseling regarding her condition or for health maintenance advice. Please see specific information pulled into the AVS if appropriate.

## 2022-08-05 NOTE — ASSESSMENT & PLAN NOTE
Advised patient daughter to continue 25 mg twice daily as her log is within normal limits and she reports the patient tolerates the lower dose better.  Patient reports she has a call into cardiology for further follow-up

## 2022-08-05 NOTE — ASSESSMENT & PLAN NOTE
Patient provided with referral to home health for medication management of diabetes and hypertension

## 2022-08-15 ENCOUNTER — TELEPHONE (OUTPATIENT)
Dept: FAMILY MEDICINE CLINIC | Facility: CLINIC | Age: 87
End: 2022-08-15

## 2022-08-15 NOTE — TELEPHONE ENCOUNTER
Caller: DONALD     Relationship:  [facility]     Best call back number: [phone]    What was the call regarding:   DONALD FROM [facility] STATED THAT AT THIS TIME THEY ARE UNABLE TO TAKE PATIENT ON DUE TO PATIENT'S INSURANCE BEING OUT OF NETWORK     Do you require a callback: YES

## 2022-08-24 DIAGNOSIS — Z79.899 REFERRED FOR MEDICATION THERAPY MANAGEMENT: Primary | ICD-10-CM

## 2022-08-24 DIAGNOSIS — E11.22 TYPE 2 DIABETES MELLITUS WITH STAGE 4 CHRONIC KIDNEY DISEASE, WITH LONG-TERM CURRENT USE OF INSULIN: ICD-10-CM

## 2022-08-24 DIAGNOSIS — Z79.4 TYPE 2 DIABETES MELLITUS WITH STAGE 4 CHRONIC KIDNEY DISEASE, WITH LONG-TERM CURRENT USE OF INSULIN: ICD-10-CM

## 2022-08-24 DIAGNOSIS — N18.4 TYPE 2 DIABETES MELLITUS WITH STAGE 4 CHRONIC KIDNEY DISEASE, WITH LONG-TERM CURRENT USE OF INSULIN: ICD-10-CM

## 2022-08-24 DIAGNOSIS — I48.20 ATRIAL FIBRILLATION, CHRONIC: ICD-10-CM

## 2022-09-12 RX ORDER — TORSEMIDE 100 MG/1
TABLET ORAL
Qty: 30 TABLET | Refills: 1 | Status: SHIPPED | OUTPATIENT
Start: 2022-09-12

## 2022-09-27 ENCOUNTER — TELEPHONE (OUTPATIENT)
Dept: CARDIOLOGY | Facility: CLINIC | Age: 87
End: 2022-09-27

## 2022-09-27 ENCOUNTER — OFFICE VISIT (OUTPATIENT)
Dept: CARDIOLOGY | Facility: CLINIC | Age: 87
End: 2022-09-27

## 2022-09-27 VITALS
WEIGHT: 189 LBS | RESPIRATION RATE: 14 BRPM | DIASTOLIC BLOOD PRESSURE: 82 MMHG | SYSTOLIC BLOOD PRESSURE: 138 MMHG | HEART RATE: 86 BPM | HEIGHT: 62 IN | BODY MASS INDEX: 34.78 KG/M2 | OXYGEN SATURATION: 96 % | TEMPERATURE: 98 F

## 2022-09-27 DIAGNOSIS — I10 ESSENTIAL HYPERTENSION: ICD-10-CM

## 2022-09-27 DIAGNOSIS — Z79.4 TYPE 2 DIABETES MELLITUS WITH STAGE 4 CHRONIC KIDNEY DISEASE, WITH LONG-TERM CURRENT USE OF INSULIN: ICD-10-CM

## 2022-09-27 DIAGNOSIS — N18.4 TYPE 2 DIABETES MELLITUS WITH STAGE 4 CHRONIC KIDNEY DISEASE, WITH LONG-TERM CURRENT USE OF INSULIN: ICD-10-CM

## 2022-09-27 DIAGNOSIS — E11.22 TYPE 2 DIABETES MELLITUS WITH STAGE 4 CHRONIC KIDNEY DISEASE, WITH LONG-TERM CURRENT USE OF INSULIN: ICD-10-CM

## 2022-09-27 DIAGNOSIS — E78.5 HYPERLIPIDEMIA LDL GOAL <100: ICD-10-CM

## 2022-09-27 DIAGNOSIS — I48.20 ATRIAL FIBRILLATION, CHRONIC: Primary | ICD-10-CM

## 2022-09-27 PROCEDURE — 93000 ELECTROCARDIOGRAM COMPLETE: CPT | Performed by: INTERNAL MEDICINE

## 2022-09-27 PROCEDURE — 99214 OFFICE O/P EST MOD 30 MIN: CPT | Performed by: INTERNAL MEDICINE

## 2022-09-27 RX ORDER — CARVEDILOL 25 MG/1
TABLET ORAL
Qty: 270 TABLET | Refills: 3 | Status: SHIPPED | OUTPATIENT
Start: 2022-09-27 | End: 2022-09-27 | Stop reason: SDUPTHER

## 2022-09-27 RX ORDER — CARVEDILOL 25 MG/1
TABLET ORAL
Qty: 270 TABLET | Refills: 3 | Status: SHIPPED | OUTPATIENT
Start: 2022-09-27

## 2022-09-27 NOTE — TELEPHONE ENCOUNTER
Daughter called concerned about the patient being more sob and having weight gain. She is bringing her in this afternoon with all her medications in the bottles.

## 2022-09-27 NOTE — PROGRESS NOTES
[facility] [place]  [facility] CARDIOLOGY  [address] [place] [address]  Dept: [phone]  Dept Fax: [phone]    Rena Hernandez  11/23/1931    Follow Up Office Visit Note    History of Present Illness:  Rena Hernandez is a 89 y.o. female who presents to the clinic for Follow-up, Shortness of Breath, and Edema. - she has some cough and congestion, seems again having some pneumonia, issues and maybe aspiration, advised to discuss issues with her PCP, she does have Diastolic heart failure and she is on Torsemide 100.50    The following portions of the patient's history were reviewed and updated as appropriate: allergies, current medications, past family history, past medical history, past social history, past surgical history and problem list.    Medications:  apixaban tablet  atorvastatin  BD Insulin Syringe U/F misc  calcium carbonate  carvedilol  docusate sodium  famotidine  Ferrous Fumarate tablet  fluticasone  guaiFENesin  Lantus solution  loratadine  mirtazapine  onetouch ultrasoft  torsemide  True Metrix Blood Glucose Test strip    Subjective  Allergies   Allergen Reactions   • Ibuprofen Other (See Comments)     Kidney bleed        Past Medical History:   Diagnosis Date   • A-fib ([facility])    • Body mass index 30.0-30.9, adult    • Carotid artery disease (HCC)    • Chest pain    • CHF (congestive heart failure) ([facility])    • Chronic atrial fibrillation, unspecified ([facility])    • Diabetes mellitus due to underlying condition ([facility])    • Diastolic heart failure (HCC)     She is doing well, no edema, no major SOb on 100.50 Torsemide alternating.   • Dyspnea    • Edema    • Hyperlipidemia    • Hypertension    • Mesenteric ischemia ([facility])    • Renal disease    • Syncope    • Ventricular tachycardia ([facility])        Past Surgical History:   Procedure Laterality Date   • ABDOMINAL AORTIC ANEURYSM REPAIR         Family History   Family history unknown: Yes        Social History     Socioeconomic  "History   • Marital status:    Tobacco Use   • Smoking status: Never Smoker   • Smokeless tobacco: Never Used   Vaping Use   • Vaping Use: Never used   Substance and Sexual Activity   • Alcohol use: Never   • Drug use: Never   • Sexual activity: Defer       Review of Systems   Constitutional: Negative.    HENT: Negative.    Respiratory: Positive for cough and shortness of breath.    Cardiovascular: Negative.    Endocrine: Negative.    Genitourinary: Negative.    Musculoskeletal: Negative.    Skin: Negative.    Allergic/Immunologic: Negative.    Neurological: Negative.    Hematological: Negative.    Psychiatric/Behavioral: Negative.        Cardiovascular Procedures    ECHO/MUGA:   STRESS TESTS:   CARDIAC CATH:   DEVICES:   HOLTER:   CT/MRI:   VASCULAR:   CARDIOTHORACIC:     Objective  Vitals:    09/27/22 1345   BP: 138/82   BP Location: Left arm   Patient Position: Lying   Cuff Size: Adult   Pulse: 86   Resp: 14   Temp: 98 °F (36.7 °C)   TempSrc: Infrared   SpO2: 96%   Weight: 85.7 kg (189 lb)   Height: 157.5 cm (62\")   PainSc: 0-No pain     Body mass index is 34.57 kg/m².     Physical Exam  Constitutional:       Appearance: Healthy appearance. Not in distress.   Neck:      Vascular: No JVR. JVD normal.   Pulmonary:      Effort: Pulmonary effort is normal.      Breath sounds: Normal breath sounds. No wheezing. No rhonchi. No rales.   Chest:      Chest wall: Not tender to palpatation.   Cardiovascular:      PMI at left midclavicular line. Normal rate. Irregularly irregular rhythm. Normal S1. Normal S2.      Murmurs: There is no murmur.      No gallop. No click. No rub.   Pulses:     Intact distal pulses.   Edema:     Peripheral edema absent.   Abdominal:      General: Bowel sounds are normal.      Palpations: Abdomen is soft.      Tenderness: There is no abdominal tenderness.   Musculoskeletal: Normal range of motion.         General: No tenderness. Skin:     General: Skin is warm and dry.   Neurological:      " General: No focal deficit present.      Mental Status: Alert and oriented to person, place and time.          Diagnostic Data    ECG 12 Lead    Date/Time: 9/27/2022 2:30 PM  Performed by: Sterling Ramon MD  Authorized by: Sterling Ramon MD   Comparison: compared with previous ECG from 7/29/2022  Similar to previous ECG  Rhythm: atrial fibrillation  Rate: tachycardic  BPM: 102  QRS axis: left    Clinical impression: abnormal EKG            Assessment and Plan  Diagnoses and all orders for this visit:    Atrial fibrillation, chronic (HCC)- rate control Hr is fast 102, on Coreg 37.,5 bid, will increase to 50 mg bid, and also Eliquis 2.5 bid  -     Discontinue: carvedilol (COREG) 25 MG tablet; Take 37,5 bid  -     CBC & Differential  -     Comprehensive Metabolic Panel  -     carvedilol (COREG) 25 MG tablet; Take 50 mg bid    Essential hypertension- BP is 110.70., on Coreg and Torsemide   -     Discontinue: carvedilol (COREG) 25 MG tablet; Take 37,5 bid  -     carvedilol (COREG) 25 MG tablet; Take 50 mg bid    Hyperlipidemia LDL goal <100- On Lipitor     Type 2 diabetes mellitus with stage 4 chronic kidney disease, with long-term current use of insulin (HCC)         Return in about 3 months (around 12/27/2022) for Recheck.    Sterling Ramon MD  09/27/2022

## 2022-09-28 ENCOUNTER — TELEPHONE (OUTPATIENT)
Dept: CARDIOLOGY | Facility: CLINIC | Age: 87
End: 2022-09-28

## 2022-09-28 LAB
ALBUMIN SERPL-MCNC: 3.5 G/DL (ref 3.5–4.6)
ALBUMIN/GLOB SERPL: 1.2 {RATIO} (ref 1.2–2.2)
ALP SERPL-CCNC: 105 IU/L (ref 44–121)
ALT SERPL-CCNC: 5 IU/L (ref 0–32)
AST SERPL-CCNC: 8 IU/L (ref 0–40)
BASOPHILS # BLD AUTO: 0 X10E3/UL (ref 0–0.2)
BASOPHILS NFR BLD AUTO: 0 %
BILIRUB SERPL-MCNC: 0.4 MG/DL (ref 0–1.2)
BUN SERPL-MCNC: 64 MG/DL (ref 10–36)
BUN/CREAT SERPL: 28 (ref 12–28)
CALCIUM SERPL-MCNC: 8.6 MG/DL (ref 8.7–10.3)
CHLORIDE SERPL-SCNC: 103 MMOL/L (ref 96–106)
CO2 SERPL-SCNC: 25 MMOL/L (ref 20–29)
CREAT SERPL-MCNC: 2.29 MG/DL (ref 0.57–1)
EGFRCR SERPLBLD CKD-EPI 2021: 20 ML/MIN/1.73
EOSINOPHIL # BLD AUTO: 0.2 X10E3/UL (ref 0–0.4)
EOSINOPHIL NFR BLD AUTO: 2 %
ERYTHROCYTE [DISTWIDTH] IN BLOOD BY AUTOMATED COUNT: 13.8 % (ref 11.7–15.4)
GLOBULIN SER CALC-MCNC: 2.9 G/DL (ref 1.5–4.5)
GLUCOSE SERPL-MCNC: 209 MG/DL (ref 70–99)
HCT VFR BLD AUTO: 30.5 % (ref 34–46.6)
HGB BLD-MCNC: 9.7 G/DL (ref 11.1–15.9)
IMM GRANULOCYTES # BLD AUTO: 0.1 X10E3/UL (ref 0–0.1)
IMM GRANULOCYTES NFR BLD AUTO: 1 %
LYMPHOCYTES # BLD AUTO: 1 X10E3/UL (ref 0.7–3.1)
LYMPHOCYTES NFR BLD AUTO: 10 %
MCH RBC QN AUTO: 29.8 PG (ref 26.6–33)
MCHC RBC AUTO-ENTMCNC: 31.8 G/DL (ref 31.5–35.7)
MCV RBC AUTO: 94 FL (ref 79–97)
MONOCYTES # BLD AUTO: 0.5 X10E3/UL (ref 0.1–0.9)
MONOCYTES NFR BLD AUTO: 5 %
NEUTROPHILS # BLD AUTO: 8.7 X10E3/UL (ref 1.4–7)
NEUTROPHILS NFR BLD AUTO: 82 %
PLATELET # BLD AUTO: 245 X10E3/UL (ref 150–450)
POTASSIUM SERPL-SCNC: 5 MMOL/L (ref 3.5–5.2)
PROT SERPL-MCNC: 6.4 G/DL (ref 6–8.5)
RBC # BLD AUTO: 3.26 X10E6/UL (ref 3.77–5.28)
SODIUM SERPL-SCNC: 146 MMOL/L (ref 134–144)
WBC # BLD AUTO: 10.5 X10E3/UL (ref 3.4–10.8)

## 2022-09-28 NOTE — TELEPHONE ENCOUNTER
"Spoke with Ms. Vel daughter, Grace, and advised her that her hgb is holding steady at 9.7 and her creatinine, kidney function, holding steady at 2.2.  She can always talk with her PCP about the anemia.  She verbalized understanding.    \"I took her to the urgent treatment center yesterday because her breathing was bad.  They gave her a breathing treatment, steroid shot, and antibiotic\".  She states they did not do an xray just went by the sounds of her lungs.    "

## 2022-09-28 NOTE — TELEPHONE ENCOUNTER
----- Message from Sterling Ramon MD sent at 9/28/2022  4:28 PM EDT -----  Lab are steady anemia Hb 9.,7 and creatinine 2,2. She should see pCP to find out more about her anemia

## 2022-09-29 ENCOUNTER — TELEPHONE (OUTPATIENT)
Dept: CARDIOLOGY | Facility: CLINIC | Age: 87
End: 2022-09-29

## 2022-10-03 ENCOUNTER — OFFICE VISIT (OUTPATIENT)
Dept: FAMILY MEDICINE CLINIC | Facility: CLINIC | Age: 87
End: 2022-10-03

## 2022-10-03 VITALS
DIASTOLIC BLOOD PRESSURE: 80 MMHG | WEIGHT: 185 LBS | HEIGHT: 62 IN | RESPIRATION RATE: 18 BRPM | BODY MASS INDEX: 34.04 KG/M2 | TEMPERATURE: 97.2 F | HEART RATE: 79 BPM | SYSTOLIC BLOOD PRESSURE: 122 MMHG | OXYGEN SATURATION: 97 %

## 2022-10-03 DIAGNOSIS — J20.8 ACUTE BRONCHITIS DUE TO OTHER SPECIFIED ORGANISMS: Primary | ICD-10-CM

## 2022-10-03 PROCEDURE — 99213 OFFICE O/P EST LOW 20 MIN: CPT | Performed by: PHYSICIAN ASSISTANT

## 2022-10-03 RX ORDER — IRON POLYSACCHARIDE COMPLEX 150 MG
CAPSULE ORAL
COMMUNITY
Start: 2022-08-13

## 2022-10-03 RX ORDER — DEXTROMETHORPHAN HYDROBROMIDE AND PROMETHAZINE HYDROCHLORIDE 15; 6.25 MG/5ML; MG/5ML
5 SOLUTION ORAL
Qty: 180 ML | Refills: 0 | Status: SHIPPED | OUTPATIENT
Start: 2022-10-03

## 2022-10-03 RX ORDER — ALBUTEROL SULFATE 2.5 MG/3ML
SOLUTION RESPIRATORY (INHALATION)
COMMUNITY
Start: 2022-09-27

## 2022-10-03 NOTE — ASSESSMENT & PLAN NOTE
Advised patient daughter to restart albuterol nebulizer treatments and prescribed Promethazine DM.  Patient's oxygen is normal to stay and nothing tremendous noted on physical exam.

## 2022-10-03 NOTE — PROGRESS NOTES
"Chief Complaint  cardiology follow up    Subjective        Rena Hernandez presents to [facility] PRIMARY CARE  History of Present Illness  Patient's daughter reports today secondary to having bronchitis.  States she was diagnosed at her cardiology office however they did not treat her she went to the urgent care.  She reports patient was given an antibiotic shot and a steroid shot and albuterol for her neb machine.  Reports the patient did improve however she stopped nebulizer treatments over the weekend and cough has restarted.      Objective   Vital Signs:  /80 (BP Location: Left arm, Patient Position: Sitting, Cuff Size: Adult)   Pulse 79   Temp 97.2 °F (36.2 °C) (Infrared)   Resp 18   Ht 157.5 cm (62\")   Wt 83.9 kg (185 lb)   SpO2 97%   BMI 33.84 kg/m²   Estimated body mass index is 33.84 kg/m² as calculated from the following:    Height as of this encounter: 157.5 cm (62\").    Weight as of this encounter: 83.9 kg (185 lb).          Physical Exam  Vitals and nursing note reviewed.   Constitutional:       General: She is not in acute distress.     Appearance: Normal appearance.   HENT:      Head: Normocephalic.      Right Ear: Hearing normal.      Left Ear: Hearing normal.   Eyes:      Pupils: Pupils are equal, round, and reactive to light.   Cardiovascular:      Rate and Rhythm: Normal rate and regular rhythm.   Pulmonary:      Effort: Pulmonary effort is normal. No respiratory distress.      Breath sounds: No wheezing or rhonchi.   Skin:     General: Skin is warm and dry.   Neurological:      Mental Status: She is alert.   Psychiatric:         Mood and Affect: Mood normal.        Result Review :                Assessment and Plan   Diagnoses and all orders for this visit:    1. Acute bronchitis due to other specified organisms (Primary)  Assessment & Plan:  Advised patient daughter to restart albuterol nebulizer treatments and prescribed Promethazine DM.  Patient's oxygen is " normal to stay and nothing tremendous noted on physical exam.    Orders:  -     promethazine-dextromethorphan (PROMETHAZINE-DM) 6.25-15 MG/5ML solution; Take 5 mL by mouth every night at bedtime. For cough, caution sedation.  Dispense: 180 mL; Refill: 0           Follow Up   No follow-ups on file.  Patient was given instructions and counseling regarding her condition or for health maintenance advice. Please see specific information pulled into the AVS if appropriate.

## 2022-10-08 DIAGNOSIS — F41.1 GAD (GENERALIZED ANXIETY DISORDER): ICD-10-CM

## 2022-10-08 DIAGNOSIS — G47.00 PERSISTENT INSOMNIA: ICD-10-CM

## 2022-10-10 RX ORDER — MIRTAZAPINE 7.5 MG/1
TABLET, FILM COATED ORAL
Qty: 30 TABLET | Refills: 1 | Status: SHIPPED | OUTPATIENT
Start: 2022-10-10